# Patient Record
Sex: FEMALE | Race: WHITE | Employment: FULL TIME | URBAN - METROPOLITAN AREA
[De-identification: names, ages, dates, MRNs, and addresses within clinical notes are randomized per-mention and may not be internally consistent; named-entity substitution may affect disease eponyms.]

---

## 2017-01-16 ENCOUNTER — OFFICE VISIT (OUTPATIENT)
Dept: FAMILY MEDICINE CLINIC | Age: 47
End: 2017-01-16

## 2017-01-16 VITALS
RESPIRATION RATE: 14 BRPM | SYSTOLIC BLOOD PRESSURE: 112 MMHG | TEMPERATURE: 98.5 F | HEART RATE: 74 BPM | DIASTOLIC BLOOD PRESSURE: 70 MMHG | WEIGHT: 156 LBS | BODY MASS INDEX: 23.72 KG/M2

## 2017-01-16 DIAGNOSIS — F90.1 ATTENTION-DEFICIT HYPERACTIVITY DISORDER, PREDOMINANTLY HYPERACTIVE TYPE: ICD-10-CM

## 2017-01-16 DIAGNOSIS — R79.89 ELEVATED TSH: Primary | ICD-10-CM

## 2017-01-16 DIAGNOSIS — R10.13 EPIGASTRIC PAIN: ICD-10-CM

## 2017-01-16 DIAGNOSIS — K21.9 GASTROESOPHAGEAL REFLUX DISEASE WITHOUT ESOPHAGITIS: ICD-10-CM

## 2017-01-16 DIAGNOSIS — F41.8 DEPRESSION WITH ANXIETY: ICD-10-CM

## 2017-01-16 PROCEDURE — 99214 OFFICE O/P EST MOD 30 MIN: CPT | Performed by: FAMILY MEDICINE

## 2017-01-16 RX ORDER — DEXTROAMPHETAMINE SACCHARATE, AMPHETAMINE ASPARTATE, DEXTROAMPHETAMINE SULFATE AND AMPHETAMINE SULFATE 3.75; 3.75; 3.75; 3.75 MG/1; MG/1; MG/1; MG/1
15 TABLET ORAL 3 TIMES DAILY
Qty: 90 TABLET | Refills: 0 | Status: CANCELLED | OUTPATIENT
Start: 2017-01-16

## 2017-01-16 RX ORDER — DEXTROAMPHETAMINE SACCHARATE, AMPHETAMINE ASPARTATE, DEXTROAMPHETAMINE SULFATE AND AMPHETAMINE SULFATE 2.5; 2.5; 2.5; 2.5 MG/1; MG/1; MG/1; MG/1
10 TABLET ORAL 3 TIMES DAILY
Qty: 90 TABLET | Refills: 0 | Status: SHIPPED | OUTPATIENT
Start: 2017-01-16 | End: 2017-06-28 | Stop reason: DRUGHIGH

## 2017-01-16 RX ORDER — BUSPIRONE HYDROCHLORIDE 15 MG/1
15 TABLET ORAL 3 TIMES DAILY
Qty: 90 TABLET | Refills: 1 | Status: SHIPPED | OUTPATIENT
Start: 2017-01-16 | End: 2017-02-06 | Stop reason: SDUPTHER

## 2017-01-16 RX ORDER — OMEPRAZOLE 40 MG/1
40 CAPSULE, DELAYED RELEASE ORAL DAILY
Qty: 60 CAPSULE | Refills: 1 | Status: SHIPPED | OUTPATIENT
Start: 2017-01-16 | End: 2017-04-03 | Stop reason: SDUPTHER

## 2017-01-16 RX ORDER — HYDROCODONE BITARTRATE AND ACETAMINOPHEN 5; 325 MG/1; MG/1
TABLET ORAL
COMMUNITY
Start: 2016-12-27 | End: 2017-06-28 | Stop reason: ALTCHOICE

## 2017-01-16 RX ORDER — OXYCODONE HYDROCHLORIDE AND ACETAMINOPHEN 5; 325 MG/1; MG/1
TABLET ORAL
COMMUNITY
Start: 2017-01-07 | End: 2017-06-28 | Stop reason: ALTCHOICE

## 2017-02-06 ENCOUNTER — TELEPHONE (OUTPATIENT)
Dept: FAMILY MEDICINE CLINIC | Age: 47
End: 2017-02-06

## 2017-02-13 ENCOUNTER — OFFICE VISIT (OUTPATIENT)
Dept: FAMILY MEDICINE CLINIC | Age: 47
End: 2017-02-13

## 2017-02-13 VITALS
HEART RATE: 66 BPM | BODY MASS INDEX: 24.6 KG/M2 | OXYGEN SATURATION: 99 % | DIASTOLIC BLOOD PRESSURE: 78 MMHG | RESPIRATION RATE: 16 BRPM | WEIGHT: 161.8 LBS | TEMPERATURE: 98.6 F | SYSTOLIC BLOOD PRESSURE: 112 MMHG

## 2017-02-13 DIAGNOSIS — F41.8 DEPRESSION WITH ANXIETY: ICD-10-CM

## 2017-02-13 DIAGNOSIS — R10.13 EPIGASTRIC PAIN: ICD-10-CM

## 2017-02-13 DIAGNOSIS — F90.9 ATTENTION DEFICIT HYPERACTIVITY DISORDER (ADHD), UNSPECIFIED ADHD TYPE: Primary | ICD-10-CM

## 2017-02-13 PROCEDURE — 99214 OFFICE O/P EST MOD 30 MIN: CPT | Performed by: FAMILY MEDICINE

## 2017-02-13 RX ORDER — DEXTROAMPHETAMINE SACCHARATE, AMPHETAMINE ASPARTATE, DEXTROAMPHETAMINE SULFATE AND AMPHETAMINE SULFATE 3.75; 3.75; 3.75; 3.75 MG/1; MG/1; MG/1; MG/1
15 TABLET ORAL 2 TIMES DAILY
Qty: 60 TABLET | Refills: 0 | Status: SHIPPED | OUTPATIENT
Start: 2017-02-13 | End: 2017-03-13 | Stop reason: SDUPTHER

## 2017-02-26 ENCOUNTER — TELEPHONE (OUTPATIENT)
Dept: FAMILY MEDICINE CLINIC | Age: 47
End: 2017-02-26

## 2017-02-26 DIAGNOSIS — G43.901 MIGRAINE WITH STATUS MIGRAINOSUS, NOT INTRACTABLE, UNSPECIFIED MIGRAINE TYPE: ICD-10-CM

## 2017-02-26 DIAGNOSIS — G43.901 MIGRAINE WITH STATUS MIGRAINOSUS, NOT INTRACTABLE, UNSPECIFIED MIGRAINE TYPE: Primary | ICD-10-CM

## 2017-02-26 RX ORDER — ONDANSETRON 4 MG/1
4-8 TABLET, ORALLY DISINTEGRATING ORAL EVERY 8 HOURS PRN
Qty: 10 TABLET | Refills: 2 | Status: SHIPPED | OUTPATIENT
Start: 2017-02-26 | End: 2017-06-28 | Stop reason: ALTCHOICE

## 2017-02-26 RX ORDER — BUTALBITAL, ACETAMINOPHEN AND CAFFEINE 50; 325; 40 MG/1; MG/1; MG/1
1 TABLET ORAL EVERY 4 HOURS PRN
Qty: 10 TABLET | Refills: 2 | Status: SHIPPED | OUTPATIENT
Start: 2017-02-26 | End: 2021-09-30 | Stop reason: ALTCHOICE

## 2017-02-26 RX ORDER — BUTALBITAL, ACETAMINOPHEN AND CAFFEINE 50; 325; 40 MG/1; MG/1; MG/1
1 TABLET ORAL EVERY 4 HOURS PRN
Qty: 10 TABLET | Refills: 2 | Status: SHIPPED | OUTPATIENT
Start: 2017-02-26 | End: 2017-02-26 | Stop reason: SDUPTHER

## 2017-02-26 RX ORDER — ONDANSETRON 4 MG/1
4-8 TABLET, ORALLY DISINTEGRATING ORAL EVERY 8 HOURS PRN
Qty: 10 TABLET | Refills: 2 | OUTPATIENT
Start: 2017-02-26 | End: 2017-02-26 | Stop reason: SDUPTHER

## 2017-03-02 ENCOUNTER — OFFICE VISIT (OUTPATIENT)
Dept: FAMILY MEDICINE CLINIC | Age: 47
End: 2017-03-02

## 2017-03-02 VITALS
OXYGEN SATURATION: 99 % | BODY MASS INDEX: 24.51 KG/M2 | RESPIRATION RATE: 16 BRPM | WEIGHT: 161.2 LBS | TEMPERATURE: 98.3 F | DIASTOLIC BLOOD PRESSURE: 78 MMHG | SYSTOLIC BLOOD PRESSURE: 122 MMHG | HEART RATE: 83 BPM

## 2017-03-02 DIAGNOSIS — G97.1 SPINAL PUNCTURE HEADACHE: ICD-10-CM

## 2017-03-02 DIAGNOSIS — G44.52 NEW DAILY PERSISTENT HEADACHE: Primary | ICD-10-CM

## 2017-03-02 PROCEDURE — 99214 OFFICE O/P EST MOD 30 MIN: CPT | Performed by: FAMILY MEDICINE

## 2017-03-02 RX ORDER — PREDNISONE 20 MG/1
TABLET ORAL
Qty: 18 TABLET | Refills: 0 | Status: SHIPPED | OUTPATIENT
Start: 2017-03-02 | End: 2017-03-12

## 2017-03-02 RX ORDER — SUMATRIPTAN 100 MG/1
100 TABLET, FILM COATED ORAL DAILY PRN
Qty: 9 TABLET | Refills: 3 | Status: SHIPPED | OUTPATIENT
Start: 2017-03-02 | End: 2022-06-30

## 2017-03-02 RX ORDER — HYDROCODONE BITARTRATE AND ACETAMINOPHEN 5; 325 MG/1; MG/1
1 TABLET ORAL EVERY 6 HOURS PRN
Qty: 10 TABLET | Refills: 0 | Status: SHIPPED | OUTPATIENT
Start: 2017-03-02 | End: 2017-06-28 | Stop reason: ALTCHOICE

## 2017-03-13 ENCOUNTER — OFFICE VISIT (OUTPATIENT)
Dept: FAMILY MEDICINE CLINIC | Age: 47
End: 2017-03-13

## 2017-03-13 VITALS
SYSTOLIC BLOOD PRESSURE: 123 MMHG | TEMPERATURE: 98.2 F | WEIGHT: 161.8 LBS | BODY MASS INDEX: 24.6 KG/M2 | OXYGEN SATURATION: 100 % | RESPIRATION RATE: 12 BRPM | HEART RATE: 88 BPM | DIASTOLIC BLOOD PRESSURE: 79 MMHG

## 2017-03-13 DIAGNOSIS — F41.8 DEPRESSION WITH ANXIETY: ICD-10-CM

## 2017-03-13 DIAGNOSIS — F90.9 ATTENTION DEFICIT HYPERACTIVITY DISORDER (ADHD), UNSPECIFIED ADHD TYPE: ICD-10-CM

## 2017-03-13 PROCEDURE — 99214 OFFICE O/P EST MOD 30 MIN: CPT | Performed by: FAMILY MEDICINE

## 2017-03-13 RX ORDER — DEXTROAMPHETAMINE SACCHARATE, AMPHETAMINE ASPARTATE, DEXTROAMPHETAMINE SULFATE AND AMPHETAMINE SULFATE 3.75; 3.75; 3.75; 3.75 MG/1; MG/1; MG/1; MG/1
15 TABLET ORAL 3 TIMES DAILY
Qty: 90 TABLET | Refills: 0 | Status: SHIPPED | OUTPATIENT
Start: 2017-03-13 | End: 2017-04-03 | Stop reason: SDUPTHER

## 2017-03-13 RX ORDER — BUSPIRONE HYDROCHLORIDE 15 MG/1
15 TABLET ORAL 3 TIMES DAILY
Qty: 90 TABLET | Refills: 5 | Status: SHIPPED | OUTPATIENT
Start: 2017-03-13 | End: 2017-04-03 | Stop reason: SDUPTHER

## 2017-03-13 RX ORDER — DEXTROAMPHETAMINE SACCHARATE, AMPHETAMINE ASPARTATE, DEXTROAMPHETAMINE SULFATE AND AMPHETAMINE SULFATE 3.75; 3.75; 3.75; 3.75 MG/1; MG/1; MG/1; MG/1
15 TABLET ORAL 3 TIMES DAILY
Qty: 90 TABLET | Refills: 0 | Status: CANCELLED | OUTPATIENT
Start: 2017-05-12

## 2017-03-13 RX ORDER — DEXTROAMPHETAMINE SACCHARATE, AMPHETAMINE ASPARTATE, DEXTROAMPHETAMINE SULFATE AND AMPHETAMINE SULFATE 3.75; 3.75; 3.75; 3.75 MG/1; MG/1; MG/1; MG/1
15 TABLET ORAL 3 TIMES DAILY
Qty: 90 TABLET | Refills: 0 | Status: CANCELLED | OUTPATIENT
Start: 2017-04-12

## 2017-03-18 ENCOUNTER — PATIENT MESSAGE (OUTPATIENT)
Dept: FAMILY MEDICINE CLINIC | Age: 47
End: 2017-03-18

## 2017-04-03 ENCOUNTER — OFFICE VISIT (OUTPATIENT)
Dept: FAMILY MEDICINE CLINIC | Age: 47
End: 2017-04-03

## 2017-04-03 VITALS
HEART RATE: 74 BPM | WEIGHT: 165 LBS | BODY MASS INDEX: 25.09 KG/M2 | SYSTOLIC BLOOD PRESSURE: 107 MMHG | DIASTOLIC BLOOD PRESSURE: 65 MMHG | OXYGEN SATURATION: 100 % | RESPIRATION RATE: 18 BRPM

## 2017-04-03 DIAGNOSIS — K21.9 GASTROESOPHAGEAL REFLUX DISEASE WITHOUT ESOPHAGITIS: ICD-10-CM

## 2017-04-03 DIAGNOSIS — F41.8 DEPRESSION WITH ANXIETY: ICD-10-CM

## 2017-04-03 DIAGNOSIS — F90.9 ATTENTION DEFICIT HYPERACTIVITY DISORDER (ADHD), UNSPECIFIED ADHD TYPE: ICD-10-CM

## 2017-04-03 PROCEDURE — 99214 OFFICE O/P EST MOD 30 MIN: CPT | Performed by: FAMILY MEDICINE

## 2017-04-03 RX ORDER — DEXTROAMPHETAMINE SACCHARATE, AMPHETAMINE ASPARTATE, DEXTROAMPHETAMINE SULFATE AND AMPHETAMINE SULFATE 3.75; 3.75; 3.75; 3.75 MG/1; MG/1; MG/1; MG/1
15 TABLET ORAL 3 TIMES DAILY
Qty: 270 TABLET | Refills: 0 | Status: SHIPPED | OUTPATIENT
Start: 2017-04-03 | End: 2017-05-04 | Stop reason: SDUPTHER

## 2017-04-03 RX ORDER — OMEPRAZOLE 40 MG/1
40 CAPSULE, DELAYED RELEASE ORAL DAILY
Qty: 180 CAPSULE | Refills: 0 | Status: SHIPPED | OUTPATIENT
Start: 2017-04-03 | End: 2021-09-30 | Stop reason: ALTCHOICE

## 2017-04-03 RX ORDER — BUSPIRONE HYDROCHLORIDE 15 MG/1
15 TABLET ORAL 3 TIMES DAILY
Qty: 270 TABLET | Refills: 0 | Status: SHIPPED | OUTPATIENT
Start: 2017-04-03 | End: 2017-06-28

## 2017-05-02 ENCOUNTER — PATIENT MESSAGE (OUTPATIENT)
Dept: FAMILY MEDICINE CLINIC | Age: 47
End: 2017-05-02

## 2017-05-02 RX ORDER — DEXTROAMPHETAMINE SACCHARATE, AMPHETAMINE ASPARTATE, DEXTROAMPHETAMINE SULFATE AND AMPHETAMINE SULFATE 3.75; 3.75; 3.75; 3.75 MG/1; MG/1; MG/1; MG/1
15 TABLET ORAL 3 TIMES DAILY
Qty: 90 TABLET | Refills: 0 | Status: CANCELLED | OUTPATIENT
Start: 2017-06-02

## 2017-05-02 RX ORDER — DEXTROAMPHETAMINE SACCHARATE, AMPHETAMINE ASPARTATE, DEXTROAMPHETAMINE SULFATE AND AMPHETAMINE SULFATE 3.75; 3.75; 3.75; 3.75 MG/1; MG/1; MG/1; MG/1
15 TABLET ORAL 3 TIMES DAILY
Qty: 90 TABLET | Refills: 0 | Status: CANCELLED | OUTPATIENT
Start: 2017-05-02

## 2017-06-28 ENCOUNTER — OFFICE VISIT (OUTPATIENT)
Dept: FAMILY MEDICINE CLINIC | Age: 47
End: 2017-06-28

## 2017-06-28 VITALS
OXYGEN SATURATION: 100 % | RESPIRATION RATE: 12 BRPM | HEART RATE: 90 BPM | WEIGHT: 157.2 LBS | BODY MASS INDEX: 23.9 KG/M2 | TEMPERATURE: 98.9 F | DIASTOLIC BLOOD PRESSURE: 75 MMHG | SYSTOLIC BLOOD PRESSURE: 110 MMHG

## 2017-06-28 DIAGNOSIS — J01.00 ACUTE MAXILLARY SINUSITIS, RECURRENCE NOT SPECIFIED: ICD-10-CM

## 2017-06-28 DIAGNOSIS — F90.9 ATTENTION DEFICIT HYPERACTIVITY DISORDER (ADHD), UNSPECIFIED ADHD TYPE: ICD-10-CM

## 2017-06-28 DIAGNOSIS — F41.8 DEPRESSION WITH ANXIETY: Primary | ICD-10-CM

## 2017-06-28 PROCEDURE — 99214 OFFICE O/P EST MOD 30 MIN: CPT | Performed by: FAMILY MEDICINE

## 2017-06-28 RX ORDER — DEXTROAMPHETAMINE SACCHARATE, AMPHETAMINE ASPARTATE, DEXTROAMPHETAMINE SULFATE AND AMPHETAMINE SULFATE 5; 5; 5; 5 MG/1; MG/1; MG/1; MG/1
20 TABLET ORAL 3 TIMES DAILY
Qty: 90 TABLET | Refills: 0 | Status: SHIPPED | OUTPATIENT
Start: 2017-08-26 | End: 2017-09-21 | Stop reason: SDUPTHER

## 2017-06-28 RX ORDER — DEXTROAMPHETAMINE SACCHARATE, AMPHETAMINE ASPARTATE, DEXTROAMPHETAMINE SULFATE AND AMPHETAMINE SULFATE 5; 5; 5; 5 MG/1; MG/1; MG/1; MG/1
20 TABLET ORAL 3 TIMES DAILY
Qty: 90 TABLET | Refills: 0 | Status: SHIPPED | OUTPATIENT
Start: 2017-06-28 | End: 2021-09-30 | Stop reason: ALTCHOICE

## 2017-06-28 RX ORDER — AMOXICILLIN AND CLAVULANATE POTASSIUM 875; 125 MG/1; MG/1
1 TABLET, FILM COATED ORAL EVERY 12 HOURS
Qty: 20 TABLET | Refills: 0 | Status: SHIPPED | OUTPATIENT
Start: 2017-06-28 | End: 2017-07-08

## 2017-06-28 RX ORDER — QUETIAPINE FUMARATE 25 MG/1
TABLET, FILM COATED ORAL
Qty: 60 TABLET | Refills: 0 | Status: SHIPPED | OUTPATIENT
Start: 2017-06-28 | End: 2017-09-27 | Stop reason: SDUPTHER

## 2017-06-28 RX ORDER — DEXTROAMPHETAMINE SACCHARATE, AMPHETAMINE ASPARTATE, DEXTROAMPHETAMINE SULFATE AND AMPHETAMINE SULFATE 5; 5; 5; 5 MG/1; MG/1; MG/1; MG/1
20 TABLET ORAL 3 TIMES DAILY
Qty: 90 TABLET | Refills: 0 | Status: SHIPPED | OUTPATIENT
Start: 2017-07-28 | End: 2017-09-21 | Stop reason: SDUPTHER

## 2017-06-28 ASSESSMENT — PATIENT HEALTH QUESTIONNAIRE - PHQ9
SUM OF ALL RESPONSES TO PHQ QUESTIONS 1-9: 0
SUM OF ALL RESPONSES TO PHQ9 QUESTIONS 1 & 2: 0
2. FEELING DOWN, DEPRESSED OR HOPELESS: 0
1. LITTLE INTEREST OR PLEASURE IN DOING THINGS: 0

## 2017-09-07 PROBLEM — N12 PYELONEPHRITIS: Status: ACTIVE | Noted: 2017-09-01

## 2017-09-08 ENCOUNTER — TELEPHONE (OUTPATIENT)
Dept: FAMILY MEDICINE CLINIC | Age: 47
End: 2017-09-08

## 2017-09-21 ENCOUNTER — OFFICE VISIT (OUTPATIENT)
Dept: FAMILY MEDICINE CLINIC | Age: 47
End: 2017-09-21

## 2017-09-21 VITALS
HEART RATE: 90 BPM | WEIGHT: 157 LBS | HEIGHT: 68 IN | DIASTOLIC BLOOD PRESSURE: 91 MMHG | BODY MASS INDEX: 23.79 KG/M2 | SYSTOLIC BLOOD PRESSURE: 125 MMHG | RESPIRATION RATE: 20 BRPM | TEMPERATURE: 97.4 F

## 2017-09-21 DIAGNOSIS — M77.12 LATERAL EPICONDYLITIS OF BOTH ELBOWS: ICD-10-CM

## 2017-09-21 DIAGNOSIS — N12 PYELONEPHRITIS: ICD-10-CM

## 2017-09-21 DIAGNOSIS — G56.03 BILATERAL CARPAL TUNNEL SYNDROME: Primary | ICD-10-CM

## 2017-09-21 DIAGNOSIS — F90.9 ATTENTION DEFICIT HYPERACTIVITY DISORDER (ADHD), UNSPECIFIED ADHD TYPE: ICD-10-CM

## 2017-09-21 DIAGNOSIS — M65.4 DE QUERVAIN'S TENOSYNOVITIS, BILATERAL: ICD-10-CM

## 2017-09-21 DIAGNOSIS — M77.11 LATERAL EPICONDYLITIS OF BOTH ELBOWS: ICD-10-CM

## 2017-09-21 PROCEDURE — 99214 OFFICE O/P EST MOD 30 MIN: CPT | Performed by: FAMILY MEDICINE

## 2017-09-21 RX ORDER — DEXTROAMPHETAMINE SACCHARATE, AMPHETAMINE ASPARTATE, DEXTROAMPHETAMINE SULFATE AND AMPHETAMINE SULFATE 5; 5; 5; 5 MG/1; MG/1; MG/1; MG/1
20 TABLET ORAL 3 TIMES DAILY
Qty: 90 TABLET | Refills: 0 | Status: SHIPPED | OUTPATIENT
Start: 2017-09-25 | End: 2021-09-30 | Stop reason: ALTCHOICE

## 2017-09-21 RX ORDER — HYDROCODONE BITARTRATE AND ACETAMINOPHEN 5; 325 MG/1; MG/1
1 TABLET ORAL EVERY 6 HOURS PRN
Qty: 20 TABLET | Refills: 0 | Status: SHIPPED | OUTPATIENT
Start: 2017-09-21 | End: 2017-09-28

## 2017-09-21 RX ORDER — PREDNISONE 20 MG/1
TABLET ORAL
Qty: 12 TABLET | Refills: 0 | Status: SHIPPED | OUTPATIENT
Start: 2017-09-21 | End: 2017-10-01

## 2017-09-21 RX ORDER — DEXTROAMPHETAMINE SACCHARATE, AMPHETAMINE ASPARTATE, DEXTROAMPHETAMINE SULFATE AND AMPHETAMINE SULFATE 5; 5; 5; 5 MG/1; MG/1; MG/1; MG/1
20 TABLET ORAL 3 TIMES DAILY
Qty: 90 TABLET | Refills: 0 | Status: SHIPPED | OUTPATIENT
Start: 2017-10-25 | End: 2022-06-30

## 2017-09-27 ENCOUNTER — TELEPHONE (OUTPATIENT)
Dept: FAMILY MEDICINE CLINIC | Age: 47
End: 2017-09-27

## 2017-09-27 DIAGNOSIS — F41.8 DEPRESSION WITH ANXIETY: ICD-10-CM

## 2017-09-27 DIAGNOSIS — R11.2 NAUSEA AND VOMITING, INTRACTABILITY OF VOMITING NOT SPECIFIED, UNSPECIFIED VOMITING TYPE: Primary | ICD-10-CM

## 2017-09-27 RX ORDER — QUETIAPINE FUMARATE 25 MG/1
TABLET, FILM COATED ORAL
Qty: 60 TABLET | Refills: 0 | Status: SHIPPED | OUTPATIENT
Start: 2017-09-27 | End: 2017-11-03 | Stop reason: SDUPTHER

## 2017-09-27 RX ORDER — PROMETHAZINE HYDROCHLORIDE 25 MG/1
25 SUPPOSITORY RECTAL EVERY 6 HOURS PRN
Qty: 12 SUPPOSITORY | Refills: 0 | Status: SHIPPED | OUTPATIENT
Start: 2017-09-27 | End: 2017-10-04

## 2017-09-28 ENCOUNTER — TELEPHONE (OUTPATIENT)
Dept: FAMILY MEDICINE CLINIC | Age: 47
End: 2017-09-28

## 2017-10-17 ENCOUNTER — TELEPHONE (OUTPATIENT)
Dept: FAMILY MEDICINE CLINIC | Age: 47
End: 2017-10-17

## 2017-11-01 ENCOUNTER — TELEPHONE (OUTPATIENT)
Dept: FAMILY MEDICINE CLINIC | Age: 47
End: 2017-11-01

## 2017-11-03 DIAGNOSIS — F41.8 DEPRESSION WITH ANXIETY: ICD-10-CM

## 2017-11-04 RX ORDER — QUETIAPINE FUMARATE 25 MG/1
TABLET, FILM COATED ORAL
Qty: 60 TABLET | Refills: 0 | Status: SHIPPED | OUTPATIENT
Start: 2017-11-04 | End: 2022-06-30

## 2018-02-19 ENCOUNTER — TELEPHONE (OUTPATIENT)
Dept: ORTHOPEDIC SURGERY | Age: 48
End: 2018-02-19

## 2018-02-19 RX ORDER — QUETIAPINE FUMARATE 50 MG/1
TABLET, FILM COATED ORAL
Qty: 60 TABLET | Refills: 1 | Status: SHIPPED | OUTPATIENT
Start: 2018-02-19 | End: 2021-09-30 | Stop reason: ALTCHOICE

## 2021-08-16 LAB
AVERAGE GLUCOSE: 105
HBA1C MFR BLD: 5.3 %

## 2021-08-17 LAB
CHOLESTEROL, TOTAL: 164 MG/DL
CHOLESTEROL/HDL RATIO: NORMAL
HDLC SERPL-MCNC: 56 MG/DL (ref 35–70)
LDL CHOLESTEROL CALCULATED: 96 MG/DL (ref 0–160)
NONHDLC SERPL-MCNC: NORMAL MG/DL
TRIGL SERPL-MCNC: 59 MG/DL
VLDLC SERPL CALC-MCNC: 12 MG/DL

## 2021-09-30 ENCOUNTER — TELEPHONE (OUTPATIENT)
Dept: PRIMARY CARE CLINIC | Age: 51
End: 2021-09-30

## 2021-09-30 ENCOUNTER — OFFICE VISIT (OUTPATIENT)
Dept: PRIMARY CARE CLINIC | Age: 51
End: 2021-09-30
Payer: MEDICAID

## 2021-09-30 VITALS
RESPIRATION RATE: 20 BRPM | BODY MASS INDEX: 26.67 KG/M2 | OXYGEN SATURATION: 98 % | WEIGHT: 176 LBS | SYSTOLIC BLOOD PRESSURE: 132 MMHG | DIASTOLIC BLOOD PRESSURE: 88 MMHG | HEART RATE: 81 BPM | HEIGHT: 68 IN | TEMPERATURE: 97.3 F

## 2021-09-30 DIAGNOSIS — K27.9 PEPTIC ULCER DISEASE: ICD-10-CM

## 2021-09-30 DIAGNOSIS — M48.061 SPINAL STENOSIS OF LUMBAR REGION, UNSPECIFIED WHETHER NEUROGENIC CLAUDICATION PRESENT: ICD-10-CM

## 2021-09-30 DIAGNOSIS — F31.9 BIPOLAR AFFECTIVE DISORDER, REMISSION STATUS UNSPECIFIED (HCC): ICD-10-CM

## 2021-09-30 DIAGNOSIS — F90.9 ATTENTION DEFICIT HYPERACTIVITY DISORDER (ADHD), UNSPECIFIED ADHD TYPE: ICD-10-CM

## 2021-09-30 DIAGNOSIS — I26.99 PULMONARY EMBOLISM, OTHER, UNSPECIFIED CHRONICITY, UNSPECIFIED WHETHER ACUTE COR PULMONALE PRESENT (HCC): ICD-10-CM

## 2021-09-30 DIAGNOSIS — I83.812 VARICOSE VEINS OF LEFT LOWER EXTREMITY WITH PAIN: ICD-10-CM

## 2021-09-30 DIAGNOSIS — Z12.39 ENCOUNTER FOR SCREENING FOR MALIGNANT NEOPLASM OF BREAST, UNSPECIFIED SCREENING MODALITY: ICD-10-CM

## 2021-09-30 DIAGNOSIS — I63.9 CEREBROVASCULAR ACCIDENT (CVA), UNSPECIFIED MECHANISM (HCC): Primary | ICD-10-CM

## 2021-09-30 PROCEDURE — G8427 DOCREV CUR MEDS BY ELIG CLIN: HCPCS | Performed by: FAMILY MEDICINE

## 2021-09-30 PROCEDURE — 1036F TOBACCO NON-USER: CPT | Performed by: FAMILY MEDICINE

## 2021-09-30 PROCEDURE — 99204 OFFICE O/P NEW MOD 45 MIN: CPT | Performed by: FAMILY MEDICINE

## 2021-09-30 PROCEDURE — G8419 CALC BMI OUT NRM PARAM NOF/U: HCPCS | Performed by: FAMILY MEDICINE

## 2021-09-30 RX ORDER — DEXTROAMPHETAMINE SACCHARATE, AMPHETAMINE ASPARTATE MONOHYDRATE, DEXTROAMPHETAMINE SULFATE AND AMPHETAMINE SULFATE 7.5; 7.5; 7.5; 7.5 MG/1; MG/1; MG/1; MG/1
30 CAPSULE, EXTENDED RELEASE ORAL EVERY MORNING
COMMUNITY
End: 2022-06-30

## 2021-09-30 RX ORDER — CELECOXIB 200 MG/1
200 CAPSULE ORAL DAILY
Qty: 30 CAPSULE | Refills: 1 | Status: SHIPPED | OUTPATIENT
Start: 2021-09-30 | End: 2022-06-30

## 2021-09-30 RX ORDER — ACETAMINOPHEN 500 MG
1000 TABLET ORAL EVERY 6 HOURS PRN
Qty: 120 TABLET | Refills: 2 | Status: SHIPPED | OUTPATIENT
Start: 2021-09-30 | End: 2022-06-30

## 2021-09-30 SDOH — ECONOMIC STABILITY: FOOD INSECURITY: WITHIN THE PAST 12 MONTHS, YOU WORRIED THAT YOUR FOOD WOULD RUN OUT BEFORE YOU GOT MONEY TO BUY MORE.: NEVER TRUE

## 2021-09-30 SDOH — ECONOMIC STABILITY: FOOD INSECURITY: WITHIN THE PAST 12 MONTHS, THE FOOD YOU BOUGHT JUST DIDN'T LAST AND YOU DIDN'T HAVE MONEY TO GET MORE.: NEVER TRUE

## 2021-09-30 ASSESSMENT — ENCOUNTER SYMPTOMS
ABDOMINAL PAIN: 0
COUGH: 0
SHORTNESS OF BREATH: 0
BLOOD IN STOOL: 0
NAUSEA: 0
VOMITING: 0
BACK PAIN: 1
SORE THROAT: 0

## 2021-09-30 ASSESSMENT — SOCIAL DETERMINANTS OF HEALTH (SDOH): HOW HARD IS IT FOR YOU TO PAY FOR THE VERY BASICS LIKE FOOD, HOUSING, MEDICAL CARE, AND HEATING?: NOT HARD AT ALL

## 2021-09-30 NOTE — TELEPHONE ENCOUNTER
PA COVER MY MEDS  Medication:Celecoxib 200MG capsules  Key:[id number] - PA Case ID: [id number] - Rx #: [id number]  Status:PENDING

## 2021-09-30 NOTE — PROGRESS NOTES
appendectomy    Pt is a nonsmoker and uses alcohol occasionally and denies any illegal drug use    FHx negative for CAD or diabetes; FHx positive for breast cancer (MAunt and MGM)     Pt reports no screening mammogram in over 7 years        Hematology Note 09/07/2021  Patient was recently evaluated during hospitalization in August 2021. She had presented with acute onset of left-sided weakness, slurred speech, difficulty concentrating and dizziness. Had received TPA at admission. Reports that her deficits are resolved. She was started on antiplatelet therapy with aspirin, also atorvastatin. Hypercoagulable work-up was obtained. Here to discuss these results. Of note, CT of the head, MRI of the brain as well as CTA head and neck were unremarkable for any evidence of acute ischemia or any vascular abnormalities. Echocardiogram was also unremarkable, LVEF at 60% with no valvular abnormalities. Patient has history of prior PE as well as DVT, diagnosed around 1999, was on anticoagulation with Coumadin at that time. Notes that she was transitioned to heparin during her pregnancy. Disease History:  -History of prior PE as well as DVT. Appears this was diagnosed around 1999 and she was on anticoagulation with Coumadin  -Transitioned to heparin during her pregnancy. Post delivery of her daughter, she was on Coumadin therapy again and states that she had stopped Coumadin therapy nearly 20 years ago.  No miscarriages  -Notes history of blood clots with her maternal grandmother, none with her mother or siblings   No residual neurologic deficits  -Remains on aspirin and statin  -Hypercoagulable work-up was unremarkable for any inherited or acquired thrombophilic state; no evidence of factor V Leiden, prothrombin gene mutation, protein C or S deficiency or antiphospholipid antibody    CBC today unremarkable for any cytopenias    Currently no indication for anticoagulation, discussed negative hypercoagulable work-up results with patient          SPINE CARE PATH LOW BACK PAIN: INTIAL    hx of l3-s1 fusion 1/20    l5-s1 fusion 2003  improved dr Glenis Wolfe    hxof c5-6 acdf april 2019after a fall and left  arm weakness  some shoulder pain  arm pain resolved    2year worsening low back pain at times to   left thigh, shin pain  relates started after fall 4/19    hx of l3-s1 fusion 1/20  drop foot, bladder issue after surgery  felt weaker left leg  worse after    saw pain management  percocet, tizanidine  neurontin upto 1200 mg bid  stopped going to pain management  ( sketchy) 8 months  switched pain management  couple of injections, PT     Prior Therapy:  gabapentin 1200 mg  bid     tizanidine    percocet off 8 months  after surgery  some help    PT- last 12/19  initially helping but some set back/flareup after    ibuprofen some gi upset    Litigation: No    Workers' Compensation: No    imaging reviewed  lumbar mri 5/26/21    personally reviewed    The patient is status post L3, L4 and L5 transpedicular screw and patricia fixation with intervertebral device placement with metallic artifact secondary to hardware limiting evaluation within this region. Disc signal and disc space heights are well-preserved at the T11-L3 levels. Vertebral body heights are well-preserved. There is stable mild retrolisthesis of L2 on L3 and of L3 on L4. Signal within the conus is unremarkable. No acute compression fracture deformities are present. At the T11-T12 level only sagittal imaging was obtained. There are no focal disc nor neural foraminal abnormalities. At the T12-L1 level there are no disc nor neural foraminal abnormalities. At the L1-L2 level there are no disc nor neural abnormalities. At the L2-L3 level there is a mild bulge with eccentric disc material noted in a bilobed fashion within the paracentral lateral recess, neural foraminal and lateral aspects bilaterally. There is minimal bilateral neural foraminal narrowing.  There is bilateral facet hypertrophic disease and ligamentum flavum hypertrophy. At the L3-L4 level there is a laminectomy defect. There is metallic artifact. There is epidural scar identified within the left lateral recess region and left neural foraminal region. There is minimal bilateral neural foraminal narrowing. At the L4-5 level there is a laminectomy defect with a diffuse bulge/protrusion and metallic artifact limiting evaluation. There is mild to moderate bilateral neural foraminal narrowing. At the L5-S1 level there is stable minimal bulge with some facet hypertrophic disease and minimal bilateral neural foraminal narrowing. IMPRESSION:     Postsurgical changes with stable mild multilevel lumbar spondylitic disease  ASSESSMENT/PLAN  (M96.1) Failed back surgical syndrome (primary encounter diagnosis)  Comment: hx of l3-5 fusion 1/20  for back, leg pain  felt more weakness, pain after    Plan: XR LUMBAR MOTION 4V AP/LAT/ FLEX/EXT  r/o instability  mri results discussed- no significant adjacent level issues,   canal stenosis seen  - discussed chronic pain syndrome  - if no spine intervention is indicated, we will  consider scs trial with pain management  -given zanaflex #40 prn for spasms  - discussed PT, she relates will start aquatic Program again  as it helped in the past  - no spine surgery indicated at this point        Review of Systems   Constitutional: Negative for fatigue and fever. HENT: Negative for nosebleeds and sore throat. Eyes:        Negative blurred vision or diplopia   Respiratory: Negative for cough and shortness of breath. Cardiovascular: Positive for leg swelling (LLE (chronic from varicose veins)). Negative for chest pain and palpitations. Gastrointestinal: Negative for abdominal pain, blood in stool, nausea and vomiting. Negative melena or indigestion   Musculoskeletal: Positive for back pain (lumbar with radiation into LLE at times (chronic)).  Negative for arthralgias and neck pain. Skin: Negative for rash. Vitals:    09/30/21 0754   BP: 132/88   Pulse: 81   Resp: 20   Temp: 97.3 °F (36.3 °C)   SpO2: 98%         Physical Exam  Vitals reviewed. Constitutional:       General: She is not in acute distress. HENT:      Mouth/Throat:      Mouth: Mucous membranes are moist.      Pharynx: Oropharynx is clear. Eyes:      General: No scleral icterus. Comments: Pink conjunctivae    Neck:      Thyroid: No thyromegaly. Comments: No carotid bruits noted B/L  Cardiovascular:      Heart sounds: Normal heart sounds. No murmur heard. No friction rub. No gallop. Comments: Venous port in place in R chest  Pulmonary:      Effort: Pulmonary effort is normal.      Breath sounds: Normal breath sounds. Abdominal:      Palpations: Abdomen is soft. Tenderness: There is no abdominal tenderness. Musculoskeletal:      Comments: No leg edema noted in RLE; Positive large mildly tender varicose vein noted medial to knee with 1+ edema but no calf tendernous to palpation   Lymphadenopathy:      Cervical: No cervical adenopathy. Skin:     Findings: No rash. Neurological:      Mental Status: She is alert. Comments: Cranial nerves 2 - 12 grossly intact; Muscle strength 5/5 throughout; Positive back pain noted on L hip flexion   Psychiatric:         Mood and Affect: Mood normal.         ASSESSMENT AND PLAN    1. Cerebrovascular accident (CVA), unspecified mechanism ([address]  Pt is currently with a nonfocal neuro exam and in the hospital had a CT scan head, MRI of the brain as well as a CTA head/neck done which were unremarkable for any evidence of acute ischemia or any vascular abnormalities. Pt also had an unremarkable 2D Echo and was evaluated by Neurology who felt that episode could be conversion disorder    2.  Spinal stenosis of lumbar region, unspecified whether neurogenic claudication present  Pt used to be followed by Pain Medicine and has been using alot of OTC ibuprofen and tylenol for control and will try pt on a daily coxeb instead of ibuprofen for better control and less GI side effect and continue tylenol as needed and reevaluate pt in 2 months  - celecoxib (CELEBREX) 200 MG capsule; Take 1 capsule by mouth daily Take with food  Dispense: 30 capsule; Refill: 1  - acetaminophen (TYLENOL) 500 MG tablet; Take 2 tablets by mouth every 6 hours as needed for Pain  Dispense: 120 tablet; Refill: 2    3. Pulmonary embolism, other, unspecified chronicity, unspecified whether acute cor pulmonale present ([facility] [address]  Pt denies any CP or SOB and is with stable VS and is being followed by Hematology and had a hypercoagulable work-up done which was unremarkable for any inherited or acquired thrombophilic state    4. Peptic ulcer disease  Patient clinically stable on present medications and denies any melena and is with a nontender abdomen on PE    5. Attention deficit hyperactivity disorder (ADHD)/Bipolar affective disorder  Pt is on high dosages of adderal and seroquel for control and will Refer pt to Psychiatry for evaluation and management especially given her recent stroke versus conversion disorder  - External Referral to Psychiatry    6. Encounter for screening for malignant neoplasm of breast, unspecified screening modality  - [facility] CANDICE DIGITAL SCREEN BILATERAL; Future    7. Varicose veins of left lower extremity with pain  Will Refer pt to Vascular Surgery for evaluation and treatment  - Polo Mishra MD, Vascular Surgery, [facility]        Kiarra Velásquez MD      Return in about 2 months (around 11/30/2021). Patient Instructions     Patient Education      Go to the ER ASAP if you develop any chest pain, shortness of breath, facial droop, slurred speech, weakness/numbness in your arms or legs or double vision! Learning About an Ischemic Stroke  What is an ischemic stroke?      An ischemic (say \"prn-LSU-fdfk\") stroke occurs when a blood clot blocks a blood vessel in the brain. This means that blood cannot flow to some part of the brain. Without blood and the oxygen it carries, this part of the brain starts to die. The part of the body controlled by the damaged area of the brain cannot work properly. This is different from a hemorrhagic (say \"owj-ita-EF-jick\") stroke, which happens when a blood vessel in the brain has burst open or has started to leak. The brain damage from a stroke starts within minutes. Quick treatment can help limit damage to the brain and make recovery more likely. People who have had a stroke may have a hard time talking, understanding things, and making decisions. They may have to relearn daily activities, such as how to eat, bathe, and dress. How well someone recovers from a stroke depends on how quickly the person gets to the hospital, where in the brain the stroke happened, and how severe it was. Training and therapy also make a difference in how well people recover. What are the symptoms? If you have any of these symptoms, call 911 or other emergency services right away:   · You have symptoms of a stroke. These may include:  ? Sudden numbness, tingling, weakness, or loss of movement in your face, arm, or leg, especially on only one side of your body. ? Sudden vision changes. ? Sudden trouble speaking. ? Sudden confusion or trouble understanding simple statements. ? Sudden problems with walking or balance. ? A sudden, severe headache that is different from past headaches. Call 911 if you have symptoms that seem like a stroke, even if they go away quickly. You may have had a transient ischemic attack (TIA), sometimes called a mini-stroke. A TIA is a warning that a stroke may happen soon. Getting early treatment for a TIA can help prevent a stroke. What causes an ischemic stroke? An ischemic stroke is caused by a blood clot that blocks blood flow in the brain.  The most common causes of blood clots include:  · Hardening of the arteries (atherosclerosis). This is caused by high blood pressure, diabetes, high cholesterol, or smoking. · Atrial fibrillation. How is ischemic stroke treated? Emergency treatment may be done to restore blood flow to the brain using medicine or a procedure. You may take medicines to help prevent another stroke. Ask your doctor if a stroke rehab program is right for you. How can you prevent another stroke? · Work with your doctor to treat any health problems you have. High blood pressure, high cholesterol, atrial fibrillation, and diabetes all raise your chances of having a stroke. · Be safe with medicines. Take your medicine exactly as prescribed. Call your doctor if you think you are having a problem with your medicine. · Have a healthy lifestyle. ? Do not smoke or allow others to smoke around you. If you need help quitting, talk to your doctor about stop-smoking programs and medicines. These can increase your chances of quitting for good. Smoking makes a stroke more likely. ? Limit alcohol to 2 drinks a day for men and 1 drink a day for women. ? Lose weight if you need to. A healthy weight will help you keep your heart and body healthy. ? Be active. Ask your doctor what type and level of activity is safe for you.  ? Eat heart-healthy foods, like fruits, vegetables, and high-fiber foods. Follow-up care is a key part of your treatment and safety. Be sure to make and go to all appointments, and call your doctor if you are having problems. It's also a good idea to know your test results and keep a list of the medicines you take. Where can you learn more? Go to [email or web address]://tod.[email or web address] [email or web address] org and sign in to your [employer] account. Enter D161 in the [facility] box to learn more about \"Learning About an Ischemic Stroke. \"     If you do not have an account, please click on the \"Sign Up Now\" link.   Current as of: July 6, 2021               Content Version: 13.0  © 1798-2274 Healthwise, [employer]. Care instructions adapted under license by [facility] ([facility]). If you have questions about a medical condition or this instruction, always ask your healthcare professional. [address] any warranty or liability for your use of this information.

## 2021-09-30 NOTE — PATIENT INSTRUCTIONS
Patient Education      Go to the ER ASAP if you develop any chest pain, shortness of breath, facial droop, slurred speech, weakness/numbness in your arms or legs or double vision! Learning About an Ischemic Stroke  What is an ischemic stroke? An ischemic (say \"wpw-CLB-mric\") stroke occurs when a blood clot blocks a blood vessel in the brain. This means that blood cannot flow to some part of the brain. Without blood and the oxygen it carries, this part of the brain starts to die. The part of the body controlled by the damaged area of the brain cannot work properly. This is different from a hemorrhagic (say \"nen-utn-AO-jick\") stroke, which happens when a blood vessel in the brain has burst open or has started to leak. The brain damage from a stroke starts within minutes. Quick treatment can help limit damage to the brain and make recovery more likely. People who have had a stroke may have a hard time talking, understanding things, and making decisions. They may have to relearn daily activities, such as how to eat, bathe, and dress. How well someone recovers from a stroke depends on how quickly the person gets to the hospital, where in the brain the stroke happened, and how severe it was. Training and therapy also make a difference in how well people recover. What are the symptoms? If you have any of these symptoms, call 911 or other emergency services right away:   · You have symptoms of a stroke. These may include:  ? Sudden numbness, tingling, weakness, or loss of movement in your face, arm, or leg, especially on only one side of your body. ? Sudden vision changes. ? Sudden trouble speaking. ? Sudden confusion or trouble understanding simple statements. ? Sudden problems with walking or balance. ? A sudden, severe headache that is different from past headaches. Call 911 if you have symptoms that seem like a stroke, even if they go away quickly.  You may have had a transient ischemic attack (TIA), sometimes called a mini-stroke. A TIA is a warning that a stroke may happen soon. Getting early treatment for a TIA can help prevent a stroke. What causes an ischemic stroke? An ischemic stroke is caused by a blood clot that blocks blood flow in the brain. The most common causes of blood clots include:  · Hardening of the arteries (atherosclerosis). This is caused by high blood pressure, diabetes, high cholesterol, or smoking. · Atrial fibrillation. How is ischemic stroke treated? Emergency treatment may be done to restore blood flow to the brain using medicine or a procedure. You may take medicines to help prevent another stroke. Ask your doctor if a stroke rehab program is right for you. How can you prevent another stroke? · Work with your doctor to treat any health problems you have. High blood pressure, high cholesterol, atrial fibrillation, and diabetes all raise your chances of having a stroke. · Be safe with medicines. Take your medicine exactly as prescribed. Call your doctor if you think you are having a problem with your medicine. · Have a healthy lifestyle. ? Do not smoke or allow others to smoke around you. If you need help quitting, talk to your doctor about stop-smoking programs and medicines. These can increase your chances of quitting for good. Smoking makes a stroke more likely. ? Limit alcohol to 2 drinks a day for men and 1 drink a day for women. ? Lose weight if you need to. A healthy weight will help you keep your heart and body healthy. ? Be active. Ask your doctor what type and level of activity is safe for you.  ? Eat heart-healthy foods, like fruits, vegetables, and high-fiber foods. Follow-up care is a key part of your treatment and safety. Be sure to make and go to all appointments, and call your doctor if you are having problems. It's also a good idea to know your test results and keep a list of the medicines you take. Where can you learn more?   Go to [email or web address] and sign in to your [employer] account. Enter D161 in the [facility] box to learn more about \"Learning About an Ischemic Stroke. \"     If you do not have an account, please click on the \"Sign Up Now\" link. Current as of: July 6, 2021               Content Version: 13.0  © 0228-9387 [employer], [facility]. Care instructions adapted under license by [facility] ([facility]). If you have questions about a medical condition or this instruction, always ask your healthcare professional. [place] [address] any warranty or liability for your use of this information.

## 2021-10-01 NOTE — TELEPHONE ENCOUNTER
The medication is DENIED; [address] letter attached. If this requires a response please respond to the pool ( [facility] [address]). Thank you please advise patient.

## 2021-10-14 ENCOUNTER — NURSE TRIAGE (OUTPATIENT)
Dept: OTHER | Facility: CLINIC | Age: 51
End: 2021-10-14

## 2021-10-14 NOTE — TELEPHONE ENCOUNTER
Reason for Disposition   Caller hangs up    Protocols used: NO CONTACT OR DUPLICATE CONTACT CALL-ADULT-AH    Received call from [facility]  at [facility] with Red Flag Complaint. Brief description of triage: Pt called in with severe abd pain on R side with swelling. Pt hung up prior to this writer speaking to her. Writer did attempt to call the pt back but was unable to reach her. LVM for pt to call back at her earliest convenience. NO TRIAGE at this time. Attention Provider: Thank you for allowing me to participate in the care of your patient. The patient was connected to triage in response to information provided to the ECC/PSC. Please do not respond through this encounter as the response is not directed to a shared pool.

## 2021-12-16 NOTE — TELEPHONE ENCOUNTER
Fredy Yap ([address])  [address] [facility] Spoke with patient, she will use the [facility] for a discount to  prescription.

## 2022-06-30 ENCOUNTER — OFFICE VISIT (OUTPATIENT)
Dept: PRIMARY CARE CLINIC | Age: 52
End: 2022-06-30
Payer: COMMERCIAL

## 2022-06-30 VITALS
HEIGHT: 68 IN | DIASTOLIC BLOOD PRESSURE: 80 MMHG | SYSTOLIC BLOOD PRESSURE: 128 MMHG | WEIGHT: 181.4 LBS | BODY MASS INDEX: 27.49 KG/M2 | TEMPERATURE: 97 F | HEART RATE: 91 BPM | OXYGEN SATURATION: 98 % | RESPIRATION RATE: 18 BRPM

## 2022-06-30 DIAGNOSIS — Z12.31 ENCOUNTER FOR SCREENING MAMMOGRAM FOR BREAST CANCER: ICD-10-CM

## 2022-06-30 DIAGNOSIS — F31.9 BIPOLAR AFFECTIVE DISORDER, REMISSION STATUS UNSPECIFIED (HCC): Primary | ICD-10-CM

## 2022-06-30 DIAGNOSIS — F90.2 ATTENTION DEFICIT HYPERACTIVITY DISORDER (ADHD), COMBINED TYPE: ICD-10-CM

## 2022-06-30 DIAGNOSIS — I83.812 VARICOSE VEINS OF LEFT LOWER EXTREMITY WITH PAIN: ICD-10-CM

## 2022-06-30 PROBLEM — M51.26 HNP (HERNIATED NUCLEUS PULPOSUS), LUMBAR: Status: ACTIVE | Noted: 2018-05-11

## 2022-06-30 PROBLEM — G81.94 LEFT HEMIPARESIS (HCC): Status: ACTIVE | Noted: 2021-08-16

## 2022-06-30 PROBLEM — G43.009 MIGRAINE WITHOUT AURA AND RESPONSIVE TO TREATMENT: Status: ACTIVE | Noted: 2022-06-30

## 2022-06-30 PROBLEM — Z95.828 PORT-A-CATH IN PLACE: Status: ACTIVE | Noted: 2022-06-30

## 2022-06-30 PROBLEM — M54.16 LUMBAR RADICULOPATHY: Status: ACTIVE | Noted: 2019-04-30

## 2022-06-30 PROBLEM — K21.9 GASTROESOPHAGEAL REFLUX DISEASE WITHOUT ESOPHAGITIS: Status: ACTIVE | Noted: 2020-01-23

## 2022-06-30 PROBLEM — I34.1 MITRAL VALVE PROLAPSE: Status: ACTIVE | Noted: 2022-06-30

## 2022-06-30 PROBLEM — N12 PYELONEPHRITIS: Status: RESOLVED | Noted: 2017-09-01 | Resolved: 2022-06-30

## 2022-06-30 PROBLEM — G81.94 LEFT HEMIPARESIS (HCC): Status: RESOLVED | Noted: 2021-08-16 | Resolved: 2022-06-30

## 2022-06-30 PROBLEM — I63.9 ACUTE CVA (CEREBROVASCULAR ACCIDENT) (HCC): Status: ACTIVE | Noted: 2021-08-16

## 2022-06-30 PROBLEM — F90.9 ADHD: Status: ACTIVE | Noted: 2021-08-16

## 2022-06-30 PROBLEM — I63.9 ACUTE CEREBROVASCULAR ACCIDENT (CVA) (HCC): Status: ACTIVE | Noted: 2021-08-16

## 2022-06-30 PROBLEM — M51.379 DEGENERATION OF LUMBAR OR LUMBOSACRAL INTERVERTEBRAL DISC: Status: ACTIVE | Noted: 2017-10-02

## 2022-06-30 PROBLEM — R47.1 DYSARTHRIA: Status: ACTIVE | Noted: 2021-08-16

## 2022-06-30 PROBLEM — M51.37 DEGENERATION OF LUMBAR OR LUMBOSACRAL INTERVERTEBRAL DISC: Status: ACTIVE | Noted: 2017-10-02

## 2022-06-30 PROCEDURE — G8419 CALC BMI OUT NRM PARAM NOF/U: HCPCS | Performed by: FAMILY MEDICINE

## 2022-06-30 PROCEDURE — 1036F TOBACCO NON-USER: CPT | Performed by: FAMILY MEDICINE

## 2022-06-30 PROCEDURE — G8427 DOCREV CUR MEDS BY ELIG CLIN: HCPCS | Performed by: FAMILY MEDICINE

## 2022-06-30 PROCEDURE — 3017F COLORECTAL CA SCREEN DOC REV: CPT | Performed by: FAMILY MEDICINE

## 2022-06-30 PROCEDURE — 99215 OFFICE O/P EST HI 40 MIN: CPT | Performed by: FAMILY MEDICINE

## 2022-06-30 RX ORDER — SUMATRIPTAN 25 MG/1
TABLET, FILM COATED ORAL
COMMUNITY
End: 2022-06-30

## 2022-06-30 RX ORDER — QUETIAPINE FUMARATE 100 MG/1
100 TABLET, FILM COATED ORAL NIGHTLY
Qty: 90 TABLET | Refills: 0 | Status: SHIPPED | OUTPATIENT
Start: 2022-06-30 | End: 2022-09-29

## 2022-06-30 RX ORDER — ATORVASTATIN CALCIUM 10 MG/1
10 TABLET, FILM COATED ORAL DAILY
COMMUNITY
Start: 2022-03-24 | End: 2022-09-06 | Stop reason: SDUPTHER

## 2022-06-30 RX ORDER — QUETIAPINE FUMARATE 50 MG/1
TABLET, FILM COATED ORAL
COMMUNITY
Start: 2022-05-05 | End: 2022-06-30

## 2022-06-30 ASSESSMENT — PATIENT HEALTH QUESTIONNAIRE - PHQ9
SUM OF ALL RESPONSES TO PHQ9 QUESTIONS 1 & 2: 0
6. FEELING BAD ABOUT YOURSELF - OR THAT YOU ARE A FAILURE OR HAVE LET YOURSELF OR YOUR FAMILY DOWN: 0
9. THOUGHTS THAT YOU WOULD BE BETTER OFF DEAD, OR OF HURTING YOURSELF: 0
SUM OF ALL RESPONSES TO PHQ QUESTIONS 1-9: 1
8. MOVING OR SPEAKING SO SLOWLY THAT OTHER PEOPLE COULD HAVE NOTICED. OR THE OPPOSITE, BEING SO FIGETY OR RESTLESS THAT YOU HAVE BEEN MOVING AROUND A LOT MORE THAN USUAL: 0
SUM OF ALL RESPONSES TO PHQ QUESTIONS 1-9: 1
7. TROUBLE CONCENTRATING ON THINGS, SUCH AS READING THE NEWSPAPER OR WATCHING TELEVISION: 0
3. TROUBLE FALLING OR STAYING ASLEEP: 0
5. POOR APPETITE OR OVEREATING: 0
2. FEELING DOWN, DEPRESSED OR HOPELESS: 0
SUM OF ALL RESPONSES TO PHQ QUESTIONS 1-9: 1
1. LITTLE INTEREST OR PLEASURE IN DOING THINGS: 0
4. FEELING TIRED OR HAVING LITTLE ENERGY: 1
SUM OF ALL RESPONSES TO PHQ QUESTIONS 1-9: 1
10. IF YOU CHECKED OFF ANY PROBLEMS, HOW DIFFICULT HAVE THESE PROBLEMS MADE IT FOR YOU TO DO YOUR WORK, TAKE CARE OF THINGS AT HOME, OR GET ALONG WITH OTHER PEOPLE: 0

## 2022-06-30 NOTE — PROGRESS NOTES
PROGRESS NOTE  Date of Service:  2022    SUBJECTIVE:  Patient ID: Zoltan Chapa is a 50 y.o. female    HPI:     Hyperlipidemia-was placed on atorvastatin 10 mg after her stroke in . No difficulty with medication    Bipolar disorder-has been on Seroquel 100 mg and finds this stabilizes her mood overall. She does report a history of ADHD and would like to go back on medication in the future. CVA- -had left-sided weakness and slurred speech treated with tPA with resolution of symptoms      Back issues-has had multiple surgeries with fusions lumbar and cervical area    She has varicose veins in her left leg which are causing pain and swelling    She reports she had a port placed after her C-spine surgery in  secondary to poor venous access  Migraine history- has used Imitrex in the past with good relief      Past Medical History:   Diagnosis Date    Acute CVA (cerebrovascular accident) ([address] 2021    Last Assessment & Plan:  Formatting of this note might be different from the original. 71-year-old female admitted for acute CVA. CT head and CTA head and neck negative Stroke neuro consulted from the ED.  NIH 5. TPA running Critical care neurology consulted MRI, TTE with bubble study in a.m.  PT/OT/speech therapy N.p.o. pending swallow study Lipid panel, A1c ordered Start aspirin 81 mg    DDD (degenerative disc disease), lumbar     L4-5, L5-S1    Degeneration of lumbar or lumbosacral intervertebral disc 10/2/2017    Depression with anxiety     Drug abuse ([address]     GI bleed 2016    HNP (herniated nucleus pulposus), lumbar 2018    Lumbar radiculopathy 2019    Mitral valve prolapse     Pulmonary emboli ([address] 8118M1    after angio - smoking    Pyelonephritis 2017    right    SVT (supraventricular tachycardia) ([address]      Past Surgical History:   Procedure Laterality Date    APPENDECTOMY      BACK SURGERY       SECTION      DILATION AND CURETTAGE OF UTERUS  2012    ELBOW SURGERY Right 01/2017    Dr. Chapo Perez (CERVIX STATUS UNKNOWN)      HYSTERECTOMY, TOTAL ABDOMINAL (CERVIX REMOVED)  07/2016    LUMBAR SPINE SURGERY  2003,2005    L5-S1    UPPER GASTROINTESTINAL ENDOSCOPY  2008    WRIST SURGERY  06/2016          Social History     Tobacco Use    Smoking status: Never Smoker    Smokeless tobacco: Never Used   Substance Use Topics    Alcohol use: No      Family History   Problem Relation Age of Onset    Drug Abuse Mother     Hypertension Mother     Hypertension Sister     Depression Sister     Drug Abuse Brother     Drug Abuse Brother     Breast Cancer Maternal Aunt      Current Outpatient Medications on File Prior to Visit   Medication Sig Dispense Refill    atorvastatin (LIPITOR) 10 MG tablet Take 10 mg by mouth daily      Butalbital-APAP-Caffeine (FIORICET PO) Take by mouth as needed       No current facility-administered medications on file prior to visit. Allergies   Allergen Reactions    Morphine Shortness Of Breath, Anaphylaxis, Itching, Nausea And Vomiting and Rash     Itching & SOB  Shortness of breathe  Pt states she 'can't breathe'      Morphine             OBJECTIVE:  Vitals:    06/30/22 0926   BP: 128/80   Site: Right Upper Arm   Position: Sitting   Cuff Size: Large Adult   Pulse: 91   Resp: 18   Temp: 97 °F (36.1 °C)   TempSrc: Temporal   SpO2: 98%   Weight: 181 lb 6.4 oz (82.3 kg)   Height: 5' 8\" (1.727 m)      Body mass index is 27.58 kg/m². Physical Exam  Constitutional:       Appearance: Normal appearance. HENT:      Head: Normocephalic and atraumatic. Eyes:      General: No scleral icterus. Conjunctiva/sclera: Conjunctivae normal.   Cardiovascular:      Rate and Rhythm: Normal rate and regular rhythm. Heart sounds: Normal heart sounds. Pulmonary:      Breath sounds: Normal breath sounds. Neurological:      General: No focal deficit present. Mental Status: She is alert and oriented to person, place, and time. Psychiatric:         Attention and Perception: Attention and perception normal.         Mood and Affect: Mood and affect normal.         Speech: Speech normal.         Behavior: Behavior normal. Behavior is cooperative. Thought Content: Thought content normal.         Cognition and Memory: Cognition and memory normal.         Judgment: Judgment normal.         ASSESSMENT:  1. Bipolar affective disorder, remission status unspecified ([facility]    2. Attention deficit hyperactivity disorder (ADHD), combined type    3. Varicose veins of left lower extremity with pain    4. Encounter for screening mammogram for breast cancer         PLAN:   1. Bipolar affective disorder, remission status unspecified ([facility])  -     QUEtiapine (SEROQUEL) 100 MG tablet; Take 1 tablet by mouth nightly, Disp-90 tablet, R-0Normal  -     External Referral to Psychiatry  2. Attention deficit hyperactivity disorder (ADHD), combined type  -     External Referral to Psychiatry  3. Varicose veins of left lower extremity with pain  -     Ambulatory referral to Vascular Surgery  4. Encounter for screening mammogram for breast cancer  -     [facility] CANDICE DIGITAL SCREEN BILATERAL; Future  Completed review of chart  Refilled her Seroquel today so that she is not off medication but do recommend appointment with psychiatry for evaluation and management of bipolar and attention deficit disorder      Return for annual physical with fasting labs. Approximately 40 minutes of time were spent in preparation, direct patient contact, care coordination, documentation and activities otherwise related to this encounter. Electronically signed by Ke Mitchell MD on 6/30/22 at 7:19 AM.     Please note this chart was generated using dragon dictation software. Although every effort was made to ensure the accuracy of this automated transcription, some errors in transcription may have occurred.

## 2022-07-06 PROBLEM — M51.37 DEGENERATION OF LUMBAR OR LUMBOSACRAL INTERVERTEBRAL DISC: Status: RESOLVED | Noted: 2017-10-02 | Resolved: 2022-07-06

## 2022-07-06 PROBLEM — I63.9 ACUTE CEREBROVASCULAR ACCIDENT (CVA) (HCC): Status: RESOLVED | Noted: 2021-08-16 | Resolved: 2022-07-06

## 2022-07-06 PROBLEM — M51.379 DEGENERATION OF LUMBAR OR LUMBOSACRAL INTERVERTEBRAL DISC: Status: RESOLVED | Noted: 2017-10-02 | Resolved: 2022-07-06

## 2022-07-06 PROBLEM — M51.26 HNP (HERNIATED NUCLEUS PULPOSUS), LUMBAR: Status: RESOLVED | Noted: 2018-05-11 | Resolved: 2022-07-06

## 2022-07-06 PROBLEM — M54.16 LUMBAR RADICULOPATHY: Status: RESOLVED | Noted: 2019-04-30 | Resolved: 2022-07-06

## 2022-07-06 PROBLEM — I63.9 ACUTE CVA (CEREBROVASCULAR ACCIDENT) (HCC): Status: RESOLVED | Noted: 2021-08-16 | Resolved: 2022-07-06

## 2022-07-08 PROBLEM — F19.10 DRUG ABUSE (HCC): Status: ACTIVE | Noted: 2022-01-21

## 2022-07-08 PROBLEM — N83.201 CYST OF RIGHT OVARY: Status: ACTIVE | Noted: 2022-01-21

## 2022-07-19 ENCOUNTER — OFFICE VISIT (OUTPATIENT)
Dept: VASCULAR SURGERY | Age: 52
End: 2022-07-19
Payer: COMMERCIAL

## 2022-07-19 VITALS
TEMPERATURE: 98 F | DIASTOLIC BLOOD PRESSURE: 89 MMHG | HEIGHT: 68 IN | WEIGHT: 176 LBS | BODY MASS INDEX: 26.67 KG/M2 | HEART RATE: 91 BPM | SYSTOLIC BLOOD PRESSURE: 142 MMHG

## 2022-07-19 DIAGNOSIS — I83.813 VARICOSE VEINS OF BOTH LOWER EXTREMITIES WITH PAIN: Primary | ICD-10-CM

## 2022-07-19 DIAGNOSIS — I83.812 VARICOSE VEINS OF LOWER EXTREMITY WITH PAIN, LEFT: Primary | ICD-10-CM

## 2022-07-19 PROCEDURE — 99203 OFFICE O/P NEW LOW 30 MIN: CPT | Performed by: SURGERY

## 2022-07-19 NOTE — PROGRESS NOTES
[facility] Vascular Surgery  Mone Vaca MD, ARENDAL, [address]    OUTPATIENT CONSULTATION    Date of Consultation:  7/19/2022    PCP:  Farrah Ludwig MD       Chief Complaint: Varicose veins    History of Present Illness:   Sherwin Peters is a 50 y.o. female who presents today for evaluation management of varicose veins. Reports a long history of varicosities. Reports a history of DVT and PE in the 1997. States that she recently presented to  for evaluation of leg pain. She was given a dose of Lovenox for presumed SVT. No ultrasound was performed due to after hours. D-dimer was only very mildly elevated at 0.67. Reports increased pain associated with the left leg varicosities. She wears a light compression, unsure of the strength. Reports that she stands quite a bit while working. She denies any chest pain, shortness of breath or ESCOBAR. She now presents for further evaluation and management. Past Medical History:  Past Medical History:   Diagnosis Date    Acute CVA (cerebrovascular accident) ([address] 08/16/2021    Last Assessment & Plan:  Formatting of this note might be different from the original. 43-year-old female admitted for acute CVA. CT head and CTA head and neck negative Stroke neuro consulted from the ED.  NIH 5. TPA running Critical care neurology consulted MRI, TTE with bubble study in a.m.  PT/OT/speech therapy N.p.o. pending swallow study Lipid panel, A1c ordered Start aspirin 81 mg    Cyst of right ovary 1/21/2022    DDD (degenerative disc disease), lumbar     L4-5, L5-S1    Degeneration of lumbar or lumbosacral intervertebral disc 10/2/2017    Depression with anxiety     Drug abuse ([address] 2014    GI bleed 02/2016    HNP (herniated nucleus pulposus), lumbar 5/11/2018    Lumbar radiculopathy 4/30/2019    Mitral valve prolapse     Pulmonary emboli ([address] 3471L6    after angio - smoking    Pyelonephritis 09/2017    right    SVT (supraventricular tachycardia) [facility])        Past Surgical History:  Past Surgical History:   Procedure Laterality Date    APPENDECTOMY  2008    BACK SURGERY       SECTION      DILATION AND CURETTAGE OF UTERUS  2012    ELBOW SURGERY Right 2017    Dr. Taisha Phillips (CERVIX STATUS UNKNOWN)      HYSTERECTOMY, TOTAL ABDOMINAL (CERVIX REMOVED)  2016    LUMBAR SPINE SURGERY  ,    L5-S1    UPPER GASTROINTESTINAL ENDOSCOPY  2008    WRIST SURGERY  2016           Home Medications:   Prior to Admission medications    Medication Sig Start Date End Date Taking?  Authorizing Provider   atorvastatin (LIPITOR) 10 MG tablet Take 10 mg by mouth daily 3/24/22   Historical Provider, MD   QUEtiapine (SEROQUEL) 100 MG tablet Take 1 tablet by mouth nightly 22   Declan Oliver MD   Butalbital-APAP-Caffeine (FIORICET PO) Take by mouth as needed  Patient not taking: Reported on 2022    Historical Provider, MD        Allergies:  Morphine and Morphine     Social History:    Social History     Socioeconomic History    Marital status: Single     Spouse name: Not on file    Number of children: Not on file    Years of education: Not on file    Highest education level: Not on file   Occupational History    Not on file   Tobacco Use    Smoking status: Never    Smokeless tobacco: Never   Substance and Sexual Activity    Alcohol use: No    Drug use: No    Sexual activity: Yes     Partners: Male   Other Topics Concern    Not on file   Social History Narrative    ** Merged History Encounter **          Social Determinants of Health     Financial Resource Strain: Low Risk     Difficulty of Paying Living Expenses: Not hard at all   Food Insecurity: No Food Insecurity    Worried About Running Out of Food in the Last Year: Never true    Ran Out of Food in the Last Year: Never true   Transportation Needs: Not on file   Physical Activity: Not on file   Stress: Not on file   Social Connections: Not on file   Intimate Partner Violence: Not on file   Housing Stability: Not on file     Review of Systems:  A comprehensive review of systems was otherwise negative except for that which was stated in the HPI. PhysicalExamination:    Vitals:    07/19/22 0946   BP: (!) 142/89   Pulse: 91   Temp: 98 °F (36.7 °C)   Weight: 176 lb (79.8 kg)   Height: 5' 8\" (1.727 m)     Body mass index is 26.76 kg/m². Physical Exam  General:  AAO x 3. NAD. WD/WN. Very pleasant  Heart:  RRR no m/r/g  Lungs:  CTA B no w/r/r  Extremities:  BLE warm and well-perfused with no C/C/E. Minimal edema. No pitting. No significant stasis discoloration or stasis dermatitis. She has a cluster of moderate sized varicosities in the left medial distal half of the thigh and proximal calf. These are quite soft without overlying erythema or induration; however, reports exquisite tenderness to palpation. Vascular:  B DP and PT 2+ palpable. Skin:  warm, dry, no rash, no jaundice. Diagnosis:     Painful left leg varicose veins    Plan:   Check venous reflux study to evaluate further. Recommend continued use of compression stockings, at least 20 to 30 mmHg knee-high's. A prescription was given. I will see her back in the next week or so, once her study has been completed.

## 2022-07-26 ENCOUNTER — PROCEDURE VISIT (OUTPATIENT)
Dept: VASCULAR SURGERY | Age: 52
End: 2022-07-26

## 2022-07-26 DIAGNOSIS — I83.812 VARICOSE VEINS OF LOWER EXTREMITY WITH PAIN, LEFT: Primary | ICD-10-CM

## 2022-07-26 DIAGNOSIS — I83.812 VARICOSE VEINS OF LOWER EXTREMITY WITH PAIN, LEFT: ICD-10-CM

## 2022-07-26 DIAGNOSIS — I83.813 VARICOSE VEINS OF BOTH LOWER EXTREMITIES WITH PAIN: ICD-10-CM

## 2022-07-29 ENCOUNTER — TELEPHONE (OUTPATIENT)
Dept: VASCULAR SURGERY | Age: 52
End: 2022-07-29

## 2022-08-02 NOTE — RESULT ENCOUNTER NOTE
Please let her know there is no evidence of \"blood clots\". She has some venous insufficiency, which would be treated with the stockings we discussed.

## 2022-08-03 ENCOUNTER — TELEPHONE (OUTPATIENT)
Dept: VASCULAR SURGERY | Age: 52
End: 2022-08-03

## 2022-08-03 NOTE — TELEPHONE ENCOUNTER
----- Message from Chante Skinner MD sent at 8/2/2022  3:03 PM EDT -----  Please let her know there is no evidence of \"blood clots\". She has some venous insufficiency, which would be treated with the stockings we discussed.

## 2022-08-03 NOTE — TELEPHONE ENCOUNTER
Informed pt of results. Pt states she has been wearing the compression stocking consistently for 2 wks and hasnt noticed any improvement. She is concerned bc the legs are still swollen and painful.

## 2022-08-19 ENCOUNTER — NURSE TRIAGE (OUTPATIENT)
Dept: OTHER | Facility: CLINIC | Age: 52
End: 2022-08-19

## 2022-08-19 NOTE — TELEPHONE ENCOUNTER
Received call from Lorenzo at [facility] with [employer]. Subjective: Caller states \"Eye swelling\"     Current Symptoms: Eye watering and swelling, blurry, right eye is worse than the left. Swells about once a week, pain with the swelling, swells so much the eye is completely closed  Nasal congestion - green mucous  Pain in the right side of the face yesterday, today pain in gums    Onset: 2 days ago for the eye swelling    Pain Severity:  Mild pain in the eyes when they swell, not as much today because they are better  than a couple days ago    Temperature: Yesterday had temp nothing today     What has been tried: Increased rest, Benadryl    History related to the current reason for call: Problem list reviewed and no current problems related to this reason for call    Recommended disposition: See in Office Today    Care advice provided, patient verbalizes understanding; denies any other questions or concerns; instructed to call back for any new or worsening symptoms. Patient/Caller agrees with recommended disposition; writer provided warm transfer to [facility] at [facility] for appointment scheduling     Attention Provider: Thank you for allowing me to participate in the care of your patient. The patient was connected to triage in response to information provided to the ECC/PSC. Please do not respond through this encounter as the response is not directed to a shared pool.       Reason for Disposition   MODERATE to SEVERE eyelid swelling on one side  (Exception: Due to a mosquito bite.)    Protocols used: Eye - Swelling-ADULT-[place]

## 2022-09-02 RX ORDER — QUETIAPINE FUMARATE 100 MG/1
100 TABLET, FILM COATED ORAL NIGHTLY
Qty: 30 TABLET | Refills: 0 | Status: SHIPPED | OUTPATIENT
Start: 2022-09-02 | End: 2022-09-13

## 2022-09-02 NOTE — TELEPHONE ENCOUNTER
Patient called and stated that she left her medication at the hotel she was staying at. When she called them back the hotel said they didn't have it however she is positive because it was in a small bag with her vitamins. She stated that she would call back if she finds them.  The medications she misplaced are   atorvastatin (LIPITOR) 10 MG tablet [[id number]]     QUEtiapine (SEROQUEL) 100 MG tablet [[id number]]

## 2022-09-06 ENCOUNTER — TELEPHONE (OUTPATIENT)
Dept: PRIMARY CARE CLINIC | Age: 52
End: 2022-09-06

## 2022-09-06 RX ORDER — ATORVASTATIN CALCIUM 10 MG/1
10 TABLET, FILM COATED ORAL DAILY
Qty: 90 TABLET | Refills: 0 | Status: SHIPPED | OUTPATIENT
Start: 2022-09-06 | End: 2022-09-07 | Stop reason: SDUPTHER

## 2022-09-06 NOTE — TELEPHONE ENCOUNTER
----- Message from Mera Byrnes MD sent at 9/6/2022 12:04 PM EDT -----  Regarding: Refill request today  Okay the refill request for her atorvastatin but she is due for annual blood work and so she should come fasting to her next appointment. She is concerned about change of symptoms she should make an appointment to discuss.

## 2022-09-06 NOTE — TELEPHONE ENCOUNTER
Seroquel was sent to the pharmacy on 09/02. Atorvastatin was a historical provider. Patient requesting 10mg Atorvastatin. Refill request sent to Dr. Candice Mai. When speaking to patient, she is noticing reactions to situations are becoming more intense. She is not sleeping as well as she was, and she \"does not feel herself\". She is asking to move up on Seroquel dosing very minimally to see if she notices any improvement. She did note that she has been on this medication for 7+  years at the same dosing, and is aware when she does not feel herself. Please advise.

## 2022-09-07 RX ORDER — ATORVASTATIN CALCIUM 10 MG/1
10 TABLET, FILM COATED ORAL DAILY
Qty: 90 TABLET | Refills: 0 | Status: SHIPPED | OUTPATIENT
Start: 2022-09-07

## 2022-09-07 NOTE — TELEPHONE ENCOUNTER
Patient has scheduled a PE for 9/13 and her atrovastin was sent in error to [address] [place] [address]- I have re-pended it to Marcella Del Valle here in [place].

## 2022-09-12 NOTE — PROGRESS NOTES
PROGRESS NOTE  Date of Service:  9/13/2022    SUBJECTIVE:  Patient ID: Kristina Shepherd is a 50 y.o. female    ASSESSMENT  No diagnosis found. PLAN:   {There are no diagnoses linked to this encounter. (Refresh or delete this SmartLink)}     No follow-ups on file. HPI:       Hyperlipidemia-was placed on atorvastatin 10 mg after her stroke in 2021. No difficulty with medication    Bipolar disorder-has been on Seroquel 100 mg and finds this stabilizes her mood overall. She does report a history of ADHD and would like to go back on medication in the future. CVA- 2021-had left-sided weakness and slurred speech treated with tPA with resolution of symptoms      Back issues-has had multiple surgeries with fusions lumbar and cervical area    She has varicose veins in her left leg which are causing pain and swelling    She reports she had a port placed after her C-spine surgery in 2019 secondary to poor venous access  Migraine history- has used Imitrex in the past with good relief    Patient's medications, allergies, past medical, surgical, social and family histories were reviewed and updated as appropriate. Review of Systems    OBJECTIVE:  There were no vitals filed for this visit. There is no height or weight on file to calculate BMI. Physical Exam        Electronically signed by Yuri Lorenzana MD on 9/13/2022 at 7:49 PM.    Please note this chart was generated using dragon dictation software. Although every effort was made to ensure the accuracy of this automated transcription, some errors in transcription may have occurred.

## 2022-09-13 ENCOUNTER — OFFICE VISIT (OUTPATIENT)
Dept: PRIMARY CARE CLINIC | Age: 52
End: 2022-09-13
Payer: MEDICAID

## 2022-09-13 VITALS
DIASTOLIC BLOOD PRESSURE: 88 MMHG | HEART RATE: 100 BPM | SYSTOLIC BLOOD PRESSURE: 136 MMHG | BODY MASS INDEX: 27.46 KG/M2 | TEMPERATURE: 96.1 F | RESPIRATION RATE: 16 BRPM | HEIGHT: 68 IN | OXYGEN SATURATION: 98 % | WEIGHT: 181.2 LBS

## 2022-09-13 DIAGNOSIS — F31.9 BIPOLAR AFFECTIVE DISORDER, REMISSION STATUS UNSPECIFIED (HCC): ICD-10-CM

## 2022-09-13 DIAGNOSIS — F51.05 INSOMNIA DUE TO OTHER MENTAL DISORDER: ICD-10-CM

## 2022-09-13 DIAGNOSIS — G89.29 CHRONIC BILATERAL LOW BACK PAIN WITHOUT SCIATICA: ICD-10-CM

## 2022-09-13 DIAGNOSIS — M54.50 CHRONIC BILATERAL LOW BACK PAIN WITHOUT SCIATICA: ICD-10-CM

## 2022-09-13 DIAGNOSIS — F99 INSOMNIA DUE TO OTHER MENTAL DISORDER: ICD-10-CM

## 2022-09-13 DIAGNOSIS — Z00.00 EXAMINATION, MEDICAL, GENERAL: Primary | ICD-10-CM

## 2022-09-13 PROCEDURE — 99396 PREV VISIT EST AGE 40-64: CPT | Performed by: FAMILY MEDICINE

## 2022-09-13 ASSESSMENT — PATIENT HEALTH QUESTIONNAIRE - PHQ9
10. IF YOU CHECKED OFF ANY PROBLEMS, HOW DIFFICULT HAVE THESE PROBLEMS MADE IT FOR YOU TO DO YOUR WORK, TAKE CARE OF THINGS AT HOME, OR GET ALONG WITH OTHER PEOPLE: 1
8. MOVING OR SPEAKING SO SLOWLY THAT OTHER PEOPLE COULD HAVE NOTICED. OR THE OPPOSITE, BEING SO FIGETY OR RESTLESS THAT YOU HAVE BEEN MOVING AROUND A LOT MORE THAN USUAL: 0
SUM OF ALL RESPONSES TO PHQ QUESTIONS 1-9: 6
3. TROUBLE FALLING OR STAYING ASLEEP: 0
6. FEELING BAD ABOUT YOURSELF - OR THAT YOU ARE A FAILURE OR HAVE LET YOURSELF OR YOUR FAMILY DOWN: 0
5. POOR APPETITE OR OVEREATING: 1
SUM OF ALL RESPONSES TO PHQ QUESTIONS 1-9: 6
2. FEELING DOWN, DEPRESSED OR HOPELESS: 1
SUM OF ALL RESPONSES TO PHQ9 QUESTIONS 1 & 2: 1
SUM OF ALL RESPONSES TO PHQ QUESTIONS 1-9: 6
7. TROUBLE CONCENTRATING ON THINGS, SUCH AS READING THE NEWSPAPER OR WATCHING TELEVISION: 2
SUM OF ALL RESPONSES TO PHQ QUESTIONS 1-9: 6
4. FEELING TIRED OR HAVING LITTLE ENERGY: 2
9. THOUGHTS THAT YOU WOULD BE BETTER OFF DEAD, OR OF HURTING YOURSELF: 0
1. LITTLE INTEREST OR PLEASURE IN DOING THINGS: 0

## 2022-09-13 NOTE — PROGRESS NOTES
9/13/2022    Silvia Esquivel is a 50 y.o. female, here for a preventive medicine evaluation. She also has concerns of back pain and difficulty sleeping        Hyperlipidemia-was placed on atorvastatin 10 mg after her stroke in 2021. No difficulty with medication    Bipolar disorder-has been on Seroquel 100 mg and finds this stabilizes her mood overall. She takes Seroquel at night and has noted recent difficulty falling asleep. She reports she has taken GeneSight testing in the past  Effexor and Prozac taken previously were not effective    CVA- 2021-had left-sided weakness and slurred speech treated with tPA with resolution of symptoms      Back issues-has had multiple surgeries with fusions lumbar and cervical area  Was previously seeing a pain management group but reports they only offered pain medication, gabapentin, muscle relaxants and she did not want to continue this. She had onset yesterday of severe back pain  Has seen Ortho in the past with multiple procedures  She prefers not to go back to a pain clinic because she does not want to be started on pain medicine. Denies bowel or bladder incontinence  Occasional radiculopathy but not always  No weakness in her lower legs    She has varicose veins in her left leg which are causing pain and swelling    She reports she had a port placed after her C-spine surgery in 2019 secondary to poor venous access    Migraine history- has used Imitrex in the past with good relief    Allergies   Allergen Reactions    Morphine Shortness Of Breath, Anaphylaxis, Itching, Nausea And Vomiting and Rash     Itching & SOB  Shortness of breathe  Pt states she 'can't breathe'      Morphine      Past Medical History:   Diagnosis Date    Acute CVA (cerebrovascular accident) ([facility] 08/16/2021    Last Assessment & Plan:  Formatting of this note might be different from the original. 68-year-old female admitted for acute CVA.   CT head and CTA head and neck negative Stroke neuro consulted from the ED.  [facility] 5. TPA running Critical care neurology consulted MRI, TTE with bubble study in a.m. PT/OT/speech therapy N.p.o. pending swallow study Lipid panel, A1c ordered Start aspirin 81 mg    Cyst of right ovary 2022    DDD (degenerative disc disease), lumbar     L4-5, L5-S1    Degeneration of lumbar or lumbosacral intervertebral disc 10/2/2017    Depression with anxiety     Drug abuse ([address]     GI bleed 2016    HNP (herniated nucleus pulposus), lumbar 2018    Lumbar radiculopathy 2019    Mitral valve prolapse     Pulmonary emboli ([address] 5716P1    after angio - smoking    Pyelonephritis 2017    right    SVT (supraventricular tachycardia) ([address]      Past Surgical History:   Procedure Laterality Date    APPENDECTOMY  2008    BACK SURGERY       SECTION      DILATION AND CURETTAGE OF UTERUS  2012    ELBOW SURGERY Right 2017    Dr. Megan Finley (CERVIX STATUS UNKNOWN)      HYSTERECTOMY, TOTAL ABDOMINAL (CERVIX REMOVED)  2016    LUMBAR SPINE SURGERY  ,    L5-S1    UPPER GASTROINTESTINAL ENDOSCOPY  2008    WRIST SURGERY  2016    [facility]     Family History   Problem Relation Age of Onset    Drug Abuse Mother     Hypertension Mother     Hypertension Sister     Depression Sister     Drug Abuse Brother     Drug Abuse Brother     Breast Cancer Maternal Aunt        Review of Systems         Vitals:    22 0951   BP: 136/88   Site: Right Upper Arm   Position: Sitting   Cuff Size: Large Adult   Pulse: 100   Resp: 16   Temp: (!) 96.1 °F (35.6 °C)   TempSrc: Temporal   SpO2: 98%   Weight: 181 lb 3.2 oz (82.2 kg)   Height: 5' 8\" (1.727 m)     Estimated body mass index is 27.55 kg/m² as calculated from the following:    Height as of this encounter: 5' 8\" (1.727 m). Weight as of this encounter: 181 lb 3.2 oz (82.2 kg). Physical Exam  Vitals reviewed. Constitutional:       Appearance: Normal appearance.    HENT: Head: Normocephalic and atraumatic. Right Ear: Tympanic membrane, ear canal and external ear normal.      Left Ear: Tympanic membrane, ear canal and external ear normal.   Eyes:      General: No scleral icterus. Extraocular Movements: Extraocular movements intact. Conjunctiva/sclera: Conjunctivae normal.      Pupils: Pupils are equal, round, and reactive to light. Neck:      Thyroid: No thyroid mass, thyromegaly or thyroid tenderness. Cardiovascular:      Rate and Rhythm: Normal rate and regular rhythm. Pulses: Normal pulses. Heart sounds: Normal heart sounds. Pulmonary:      Effort: Pulmonary effort is normal.      Breath sounds: Normal breath sounds. No wheezing, rhonchi or rales. Abdominal:      Palpations: Abdomen is soft. Tenderness: There is no abdominal tenderness. There is no guarding or rebound. Musculoskeletal:      Cervical back: Neck supple. No rigidity. No muscular tenderness. Right lower leg: No edema. Left lower leg: No edema. Lymphadenopathy:      Cervical: No cervical adenopathy. Skin:     General: Skin is warm and dry. Findings: No rash. Neurological:      General: No focal deficit present. Mental Status: She is alert and oriented to person, place, and time. Cranial Nerves: No cranial nerve deficit. Sensory: No sensory deficit. Psychiatric:         Mood and Affect: Mood normal.         Behavior: Behavior normal.         Thought Content: Thought content normal.         Judgment: Judgment normal.       No flowsheet data found.       The 10-year ASCVD risk score (Huey Rae et al., 2013) is: 1%    Values used to calculate the score:      Age: 50 years      Sex: Female      Is Non- : No      Diabetic: No      Tobacco smoker: No      Systolic Blood Pressure: 129 mmHg      Is BP treated: No      HDL Cholesterol: 56 mg/dL      Total Cholesterol: 164 mg/dL  Immunization History   Administered Date(s) Administered    Influenza Virus Vaccine 01/27/2019    Tdap (Boostrix, Adacel) 01/26/2019, 01/26/2019     Health Maintenance   Topic Date Due    COVID-19 Vaccine (1) Never done    HIV screen  Never done    Hepatitis C screen  Never done    Breast cancer screen  06/29/2022    Shingles vaccine (1 of 2) Never done    Flu vaccine (1) 09/01/2022    Lipids  08/17/2022    Depression Monitoring  06/30/2023    Diabetes screen  08/16/2024    Colorectal Cancer Screen  02/09/2026    DTaP/Tdap/Td vaccine (2 - Td or Tdap) 01/26/2029    Hepatitis A vaccine  Aged Out    Hepatitis B vaccine  Aged Out    Hib vaccine  Aged Out    Meningococcal (ACWY) vaccine  Aged Out    Pneumococcal 0-64 years Vaccine  Aged Out       ASSESSMENT:  1. Examination, medical, general    2. Chronic bilateral low back pain without sciatica    3. Bipolar affective disorder, remission status unspecified ([facility]    4. Insomnia due to other mental disorder         PLAN:  1. Examination, medical, general  Patient will go to [facility] center to have labs drawn through her port  Will adjust atorvastatin if needed  - Comprehensive Metabolic Panel; Future  - Lipid Panel; Future  - Hepatitis C Antibody; Future  - CBC with Auto Differential; Future  - TSH with Reflex; Future    2. Chronic bilateral low back pain without sciatica  Extensive history of back pain, procedures and treatment modalities in the past.  Do recommend second opinion with [facility] [address]    3. Bipolar affective disorder, remission status unspecified ([facility]  Asked her to bring in her GeneSight testing results in order to review for other medication options    4. Insomnia due to other mental disorder  When she brings in the GeneSight testing can determine other medications that might be helpful. Do not drink anything with caffeine after noon. Try to get at least 30 minutes of physical activity each day.   Ensure your bedroom is cool, dark, quiet and bedding is comfortable. Avoid watching TV or use of other electronic devices for at least 1 hour before going to bed. Go to bed at the same time every night. Consider [email or web address] an online sleep improvement program.      Recommend cold vaccine, Shingrix vaccine and annual flu vaccine. Patient declines    Return for as needed. An electronic signature was used to authenticate this note.     --Maykel López MD on 9/13/2022 at 11:05 AM

## 2022-09-29 DIAGNOSIS — F31.9 BIPOLAR AFFECTIVE DISORDER, REMISSION STATUS UNSPECIFIED (HCC): ICD-10-CM

## 2022-09-29 RX ORDER — QUETIAPINE FUMARATE 100 MG/1
TABLET, FILM COATED ORAL
Qty: 90 TABLET | Refills: 0 | Status: SHIPPED | OUTPATIENT
Start: 2022-09-29

## 2022-09-29 NOTE — TELEPHONE ENCOUNTER
Medication:   Requested Prescriptions     Pending Prescriptions Disp Refills    QUEtiapine (SEROQUEL) 100 MG tablet [Pharmacy Med Name: QUEtiapine FUMARATE 100 MG TAB] 30 tablet      Sig: TAKE ONE TABLET BY MOUTH AT BEDTIME        Last Filled:  06/30/2022 #90 with no refills     Patient Phone Number: [phone] (home)     Last appt: 9/13/2022   Next appt: Visit date not found    Last OARRS:   RX Monitoring 6/28/2017   Attestation The Prescription Monitoring Report for this patient was reviewed today. Periodic Controlled Substance Monitoring Possible medication side effects, risk of tolerance and/or dependence, and alternative treatments discussed; No signs of potential drug abuse or diversion identified.

## 2022-10-02 ENCOUNTER — TELEPHONE (OUTPATIENT)
Dept: PRIMARY CARE CLINIC | Age: 52
End: 2022-10-02

## 2022-10-02 NOTE — PROGRESS NOTES
Patient called on-call today. She has been having dizziness since Friday. She is also noticing she is having a hard time finding her words. She said the dizziness is gotten worse it is worse when she lays on 1 side versus the other. Due to patient's extensive medical history of past CVA recommend patient go to ER.

## 2022-10-04 ENCOUNTER — TELEPHONE (OUTPATIENT)
Dept: PRIMARY CARE CLINIC | Age: 52
End: 2022-10-04

## 2022-10-04 NOTE — TELEPHONE ENCOUNTER
Pt states that the NP sent her to the ED over the weekend. Pt spent 2 days in the hospital.  Pt was diagnosed with Vertigo. Pt is still experiencing nausea. Pt would like to see if the provider could prescribe Meclizine for the nausea. Pt was offered an VV or OV and declined both. Pt went to [address] [place] [address].

## 2022-10-05 ENCOUNTER — TELEMEDICINE (OUTPATIENT)
Dept: PRIMARY CARE CLINIC | Age: 52
End: 2022-10-05
Payer: MEDICAID

## 2022-10-05 DIAGNOSIS — R11.0 NAUSEA: ICD-10-CM

## 2022-10-05 DIAGNOSIS — R51.9 NONINTRACTABLE HEADACHE, UNSPECIFIED CHRONICITY PATTERN, UNSPECIFIED HEADACHE TYPE: ICD-10-CM

## 2022-10-05 DIAGNOSIS — R42 VERTIGO: Primary | ICD-10-CM

## 2022-10-05 PROCEDURE — G8484 FLU IMMUNIZE NO ADMIN: HCPCS | Performed by: STUDENT IN AN ORGANIZED HEALTH CARE EDUCATION/TRAINING PROGRAM

## 2022-10-05 PROCEDURE — 99213 OFFICE O/P EST LOW 20 MIN: CPT | Performed by: STUDENT IN AN ORGANIZED HEALTH CARE EDUCATION/TRAINING PROGRAM

## 2022-10-05 PROCEDURE — 1036F TOBACCO NON-USER: CPT | Performed by: STUDENT IN AN ORGANIZED HEALTH CARE EDUCATION/TRAINING PROGRAM

## 2022-10-05 PROCEDURE — G8419 CALC BMI OUT NRM PARAM NOF/U: HCPCS | Performed by: STUDENT IN AN ORGANIZED HEALTH CARE EDUCATION/TRAINING PROGRAM

## 2022-10-05 PROCEDURE — 3017F COLORECTAL CA SCREEN DOC REV: CPT | Performed by: STUDENT IN AN ORGANIZED HEALTH CARE EDUCATION/TRAINING PROGRAM

## 2022-10-05 PROCEDURE — G8427 DOCREV CUR MEDS BY ELIG CLIN: HCPCS | Performed by: STUDENT IN AN ORGANIZED HEALTH CARE EDUCATION/TRAINING PROGRAM

## 2022-10-05 RX ORDER — TRAMADOL HYDROCHLORIDE 50 MG/1
50 TABLET ORAL EVERY 6 HOURS PRN
Qty: 12 TABLET | Refills: 0 | Status: SHIPPED | OUTPATIENT
Start: 2022-10-05 | End: 2022-10-08

## 2022-10-05 RX ORDER — ONDANSETRON 4 MG/1
4 TABLET, ORALLY DISINTEGRATING ORAL 3 TIMES DAILY PRN
Qty: 21 TABLET | Refills: 0 | Status: SHIPPED | OUTPATIENT
Start: 2022-10-05

## 2022-10-05 RX ORDER — MECLIZINE HCL 12.5 MG/1
12.5 TABLET ORAL 3 TIMES DAILY PRN
COMMUNITY
Start: 2022-10-03 | End: 2022-10-14 | Stop reason: SDUPTHER

## 2022-10-05 ASSESSMENT — ENCOUNTER SYMPTOMS
ABDOMINAL PAIN: 0
NAUSEA: 1
SHORTNESS OF BREATH: 0
WHEEZING: 0

## 2022-10-05 NOTE — ASSESSMENT & PLAN NOTE
Uncontrolled. Patient was instructed to continue with the meclizine for dizziness, try ondansetron for nausea, 3-day course of tramadol for the severe headache.  She has also been referred to PT for vestibular rehab and has been referred to neurologist.

## 2022-10-05 NOTE — PROGRESS NOTES
Mirna Faith (:  1970) is a 50 y.o. female,Established patient, here for evaluation of the following chief complaint(s): Nausea (Pt was admitted to hospital over the weekend, dx'd with vertigo. Was given meclizine still having HA and dizziness and nausea)         ASSESSMENT/PLAN:  1. Vertigo  Assessment & Plan:  Uncontrolled. Orders:  -     ondansetron (ZOFRAN-ODT) 4 MG disintegrating tablet; Take 1 tablet by mouth 3 times daily as needed for Nausea or Vomiting, Disp-21 tablet, R-0Normal  -     traMADol (ULTRAM) 50 MG tablet; Take 1 tablet by mouth every 6 hours as needed for Pain for up to 3 days. Intended supply: 3 days. Take lowest dose possible to manage pain, Disp-12 tablet, R-0Normal  -     [facility] - Driss Ospina MD, Neurology, [address] [facility] [place]  2. Nausea  -     ondansetron (ZOFRAN-ODT) 4 MG disintegrating tablet; Take 1 tablet by mouth 3 times daily as needed for Nausea or Vomiting, Disp-21 tablet, R-0Normal  3. Nonintractable headache, unspecified chronicity pattern, unspecified headache type  -     traMADol (ULTRAM) 50 MG tablet; Take 1 tablet by mouth every 6 hours as needed for Pain for up to 3 days. Intended supply: 3 days. Take lowest dose possible to manage pain, Disp-12 tablet, R-0Normal    Return if symptoms worsen or fail to improve. SUBJECTIVE/OBJECTIVE:  Patient presents for vertigo follow up. She was admitted with initial concerns for stroke. MRI brain, CT head, CTA neck were all unremarkable. She was subsequently diagnosed with vertigo and prescribed meclizine. Today she reports that her symptoms are not getting any better. Still having headache, dizziness, nausea. Dizziness and balanced problems worst on movement. She has tried tylenol, ibuprofen, Fioricet without any improvement in headaches. Of note, her symptoms started 5 days ago and she was sent to [address] [place] [address]. Review of Systems   Constitutional:  Positive for fatigue. Negative for fever. Respiratory:  Negative for shortness of breath and wheezing. Cardiovascular:  Negative for chest pain and palpitations. Gastrointestinal:  Positive for nausea. Negative for abdominal pain. Neurological:  Positive for dizziness and headaches. Patient-Reported Vitals 10/5/2022   Patient-Reported Weight 184 lb   Patient-Reported Height 5 8        Physical Exam  Constitutional:       General: She is not in acute distress. Appearance: Normal appearance. She is not ill-appearing or toxic-appearing. Neurological:      Mental Status: She is alert and oriented to person, place, and time. Psychiatric:         Mood and Affect: Mood normal.         Behavior: Behavior normal.         Vance Garner was evaluated through a synchronous (real-time) audio-video encounter. The patient (or guardian if applicable) is aware that this is a billable service. Verbal consent to proceed was obtained today. The visit was conducted pursuant to the emergency declaration under the [address] [place] [address] authority and the [employer] General Act. Patient identification was verified, and a caregiver was present when appropriate. The patient was located in a state where the provider was credentialed to provide care. This dictation was generated by voice recognition computer software. Although all attempts are made to edit the dictation for accuracy, there may be errors in the transcription that are not intended. An electronic signature was used to authenticate this note.     --Guevara Mccarthy MD

## 2022-10-14 RX ORDER — MECLIZINE HCL 12.5 MG/1
12.5 TABLET ORAL 3 TIMES DAILY PRN
Qty: 30 TABLET | Refills: 0 | Status: SHIPPED | OUTPATIENT
Start: 2022-10-14

## 2022-10-14 NOTE — TELEPHONE ENCOUNTER
Patient was told last week that she was told to go to the ER because of a possible stroke, but it just ended up being Vertigo. Patient is requesting a refill of meclizine (ANTIVERT) 12.5 MG tablet to be sent to [facility]

## 2022-11-08 ENCOUNTER — TELEMEDICINE (OUTPATIENT)
Dept: PRIMARY CARE CLINIC | Age: 52
End: 2022-11-08
Payer: MEDICAID

## 2022-11-08 DIAGNOSIS — H57.89 EYE SWELLING, BILATERAL: Primary | ICD-10-CM

## 2022-11-08 PROCEDURE — 3017F COLORECTAL CA SCREEN DOC REV: CPT | Performed by: FAMILY MEDICINE

## 2022-11-08 PROCEDURE — G8427 DOCREV CUR MEDS BY ELIG CLIN: HCPCS | Performed by: FAMILY MEDICINE

## 2022-11-08 PROCEDURE — 99213 OFFICE O/P EST LOW 20 MIN: CPT | Performed by: FAMILY MEDICINE

## 2022-11-08 RX ORDER — METHYLPREDNISOLONE 4 MG/1
TABLET ORAL
Qty: 1 KIT | Refills: 0 | Status: SHIPPED | OUTPATIENT
Start: 2022-11-08 | End: 2022-11-14

## 2022-11-08 ASSESSMENT — PATIENT HEALTH QUESTIONNAIRE - PHQ9
2. FEELING DOWN, DEPRESSED OR HOPELESS: 0
6. FEELING BAD ABOUT YOURSELF - OR THAT YOU ARE A FAILURE OR HAVE LET YOURSELF OR YOUR FAMILY DOWN: 0
9. THOUGHTS THAT YOU WOULD BE BETTER OFF DEAD, OR OF HURTING YOURSELF: 0
SUM OF ALL RESPONSES TO PHQ QUESTIONS 1-9: 0
SUM OF ALL RESPONSES TO PHQ QUESTIONS 1-9: 0
1. LITTLE INTEREST OR PLEASURE IN DOING THINGS: 0
10. IF YOU CHECKED OFF ANY PROBLEMS, HOW DIFFICULT HAVE THESE PROBLEMS MADE IT FOR YOU TO DO YOUR WORK, TAKE CARE OF THINGS AT HOME, OR GET ALONG WITH OTHER PEOPLE: 0
4. FEELING TIRED OR HAVING LITTLE ENERGY: 0
7. TROUBLE CONCENTRATING ON THINGS, SUCH AS READING THE NEWSPAPER OR WATCHING TELEVISION: 0
SUM OF ALL RESPONSES TO PHQ QUESTIONS 1-9: 0
3. TROUBLE FALLING OR STAYING ASLEEP: 0
SUM OF ALL RESPONSES TO PHQ QUESTIONS 1-9: 0
SUM OF ALL RESPONSES TO PHQ9 QUESTIONS 1 & 2: 0
5. POOR APPETITE OR OVEREATING: 0
8. MOVING OR SPEAKING SO SLOWLY THAT OTHER PEOPLE COULD HAVE NOTICED. OR THE OPPOSITE, BEING SO FIGETY OR RESTLESS THAT YOU HAVE BEEN MOVING AROUND A LOT MORE THAN USUAL: 0

## 2022-11-08 NOTE — PROGRESS NOTES
Lake Nunez (:  1970) is a 50 y.o. female,Established patient, here for evaluation of the following chief complaint(s): Congestion, Facial Swelling, Sinus Problem, and Other ([phone])      ASSESSMENT/PLAN:  1. Eye swelling, bilateral  -     methylPREDNISolone (MEDROL DOSEPACK) 4 MG tablet; Take by mouth., Disp-1 kit, R-0Normal  Possible allergic type reaction. Do recommend cool compresses to eyes for symptom relief but will begin steroid pack  If symptoms persist or worsen do return to office  Return if symptoms worsen or fail to improve.     SUBJECTIVE/OBJECTIVE:    She was in her usual state of health until 2 to 3 days ago when she noted nasal congestion and postnasal drainage  Symptoms persist however today she had onset of swelling about her eyelids bilaterally  No changes in vision  No new products  No other areas of swelling  No difficulty breathing shortness of breath  [INSTRUCTIONS:  \"[x]\" Indicates a positive item  \"[]\" Indicates a negative item  -- DELETE ALL ITEMS NOT EXAMINED]    Constitutional: [x] Appears well-developed and well-nourished [x] No apparent distress      [] Abnormal -     Mental status: [x] Alert and awake  [x] Oriented to person/place/time [x] Able to follow commands    [] Abnormal -     Eyes:   EOM    [x]  Normal    [] Abnormal -   Sclera  [x]  Normal    [] Abnormal -          Discharge [x]  None visible   [] Abnormal -     HENT: [x] Normocephalic, atraumatic  [] Abnormal -   [x] Mouth/Throat: Mucous membranes are moist    External Ears [x] Normal  [] Abnormal -    Neck: [x] No visualized mass [] Abnormal -     Pulmonary/Chest: [x] Respiratory effort normal   [x] No visualized signs of difficulty breathing or respiratory distress        [] Abnormal -      Musculoskeletal:   [x] Normal gait with no signs of ataxia         [x] Normal range of motion of neck        [] Abnormal -     Neurological:        [x] No Facial Asymmetry (Cranial nerve 7 motor function) (limited exam due to video visit)          [x] No gaze palsy        [] Abnormal -          Skin:        [x] No significant exanthematous lesions or discoloration noted on facial skin         [] Abnormal -            Psychiatric:       [x] Normal Affect [] Abnormal -        [x] No Hallucinations    Other pertinent observable physical exam findings:-    Electronically signed by Vicki Koch MD on 11/8/2022 at 6:12 PM     Please note this chart was generated using dragon dictation software. Although every effort was made to ensure the accuracy of this automated transcription, some errors in transcription may have occurred. Moe Cruz, was evaluated through a synchronous (real-time) audio-video encounter. The patient (or guardian if applicable) is aware that this is a billable service, which includes applicable co-pays. This Virtual Visit was conducted with patient's (and/or legal guardian's) consent. The visit was conducted pursuant to the emergency declaration under the [facility], [address] [place] [address] authority and the [employer] General Act. Patient identification was verified, and a caregiver was present when appropriate. The patient was located at Home: [address] [address]. Provider was located at [facility] [employer] ([address] [place], [place] [address]t): [address] [place] [address],  [address] [place] [address].

## 2022-12-06 DIAGNOSIS — F31.9 BIPOLAR AFFECTIVE DISORDER, REMISSION STATUS UNSPECIFIED (HCC): ICD-10-CM

## 2022-12-06 RX ORDER — QUETIAPINE FUMARATE 100 MG/1
TABLET, FILM COATED ORAL
Qty: 90 TABLET | Refills: 0 | Status: SHIPPED | OUTPATIENT
Start: 2022-12-06

## 2022-12-06 NOTE — TELEPHONE ENCOUNTER
Medication:   Requested Prescriptions     Pending Prescriptions Disp Refills    QUEtiapine (SEROQUEL) 100 MG tablet 90 tablet 0     Sig: TAKE ONE TABLET BY MOUTH AT BEDTIME        Last Filled:  09/29/2022 - 90 tablet     Patient Phone Number: [phone] (home)     Last appt: 11/8/2022   Next appt: none    Last OARRS:   RX Monitoring 6/28/2017   Attestation The Prescription Monitoring Report for this patient was reviewed today. Periodic Controlled Substance Monitoring Possible medication side effects, risk of tolerance and/or dependence, and alternative treatments discussed; No signs of potential drug abuse or diversion identified.

## 2023-01-11 ENCOUNTER — OFFICE VISIT (OUTPATIENT)
Dept: PRIMARY CARE CLINIC | Age: 53
End: 2023-01-11
Payer: MEDICAID

## 2023-01-11 VITALS
BODY MASS INDEX: 28.28 KG/M2 | SYSTOLIC BLOOD PRESSURE: 140 MMHG | WEIGHT: 186 LBS | TEMPERATURE: 98.7 F | HEART RATE: 91 BPM | OXYGEN SATURATION: 99 % | DIASTOLIC BLOOD PRESSURE: 90 MMHG

## 2023-01-11 DIAGNOSIS — H05.223 EDEMA OF BOTH ORBITS: Primary | ICD-10-CM

## 2023-01-11 PROCEDURE — 99213 OFFICE O/P EST LOW 20 MIN: CPT | Performed by: NURSE PRACTITIONER

## 2023-01-11 PROCEDURE — 1036F TOBACCO NON-USER: CPT | Performed by: NURSE PRACTITIONER

## 2023-01-11 PROCEDURE — G8484 FLU IMMUNIZE NO ADMIN: HCPCS | Performed by: NURSE PRACTITIONER

## 2023-01-11 PROCEDURE — G8427 DOCREV CUR MEDS BY ELIG CLIN: HCPCS | Performed by: NURSE PRACTITIONER

## 2023-01-11 PROCEDURE — 3017F COLORECTAL CA SCREEN DOC REV: CPT | Performed by: NURSE PRACTITIONER

## 2023-01-11 PROCEDURE — G8419 CALC BMI OUT NRM PARAM NOF/U: HCPCS | Performed by: NURSE PRACTITIONER

## 2023-01-11 RX ORDER — PREDNISONE 10 MG/1
TABLET ORAL
Qty: 55 TABLET | Refills: 0 | Status: SHIPPED | OUTPATIENT
Start: 2023-01-11

## 2023-01-11 RX ORDER — TRIAMCINOLONE ACETONIDE 40 MG/ML
80 INJECTION, SUSPENSION INTRA-ARTICULAR; INTRAMUSCULAR ONCE
Status: COMPLETED | OUTPATIENT
Start: 2023-01-11 | End: 2023-01-11

## 2023-01-11 RX ADMIN — TRIAMCINOLONE ACETONIDE 80 MG: 40 INJECTION, SUSPENSION INTRA-ARTICULAR; INTRAMUSCULAR at 11:18

## 2023-01-11 NOTE — PROGRESS NOTES
PROGRESS NOTE  Date of Service:  1/11/2023  Address: [place] [address] [facility]  [address] [place] [address] [address] [facility]  Dept: [phone]  Loc: [phone]    Subjective:      Patient ID: [id number]  Kathi Vilchis is a 50 y.o. female    HPI: patient is here with eye swelling and tearing last night. Patient said that her eyes do hurt as well. Patient said that she has been crying, she has taken claratin, benedryl which she does not feel like it is helping. Patient denies anyone else in the house has pink eye. Patient denies itching, she said burning, she said pressure in her eyes and face. Patient denies head congestion, patient said she is getting mucus. Patient said just face ach. Patient said it started slowly, patient did take bactrium she said last night which she feels did not help. Patient said she has blurred vision as well due to the swelling. Patient is taking probiotics. Patient said he did use eye drops     Review of Systems   Constitutional:  Positive for fatigue. Negative for chills and fever. HENT:  Positive for congestion, sinus pressure and sinus pain. Negative for sore throat and trouble swallowing. Eyes:  Positive for pain and discharge. Negative for photophobia, redness, itching and visual disturbance. All other systems reviewed and are negative. Objective:   Physical Exam  Vitals reviewed. Constitutional:       Appearance: Normal appearance. She is well-developed. HENT:      Head: Normocephalic and atraumatic. Right Ear: Tympanic membrane, ear canal and external ear normal.      Left Ear: Tympanic membrane, ear canal and external ear normal.      Nose: Nose normal.      Mouth/Throat:      Pharynx: Uvula midline. No oropharyngeal exudate. Eyes:      Extraocular Movements: Extraocular movements intact. Right eye: Normal extraocular motion. Left eye: Normal extraocular motion.       Conjunctiva/sclera: Conjunctivae normal.      Comments: Bilateral eye swelling    Cardiovascular:      Rate and Rhythm: Normal rate and regular rhythm.      Pulses: Normal pulses.      Heart sounds: Normal heart sounds. No murmur heard.  Pulmonary:      Effort: Pulmonary effort is normal.      Breath sounds: Normal breath sounds.   Skin:     General: Skin is warm and dry.   Neurological:      Mental Status: She is alert and oriented to person, place, and time.   Psychiatric:         Behavior: Behavior normal.         Thought Content: Thought content normal.         Judgment: Judgment normal.       Plan:   1. Edema of both orbits-patient is experiencing orbital edema bilateral.  Recommend patient see an allergist this is been the third time per patient that this happened.  Unable to obtain a picture due to technical difficulties.  Patient will upload 1 to my chart.  Patient will start oral steroids tomorrow.  Patient said the reason she cannot see well is because how swollen her eyes are.  -     predniSONE (DELTASONE) 10 MG tablet; 5 tabs for 5 days, 4 tabs po daily for 4 days, then 3 po daily for 3 days, then 2 po daily for 2 days, then 1 po daily for 1 day, Disp-55 tablet, R-0Normal  -     [facility] - [facility] MD, Allergy & Immunology, [facility]  -     triamcinolone acetonide (KENALOG-40) injection 80 mg; 80 mg, IntraMUSCular, ONCE, 1 dose, On Wed 1/11/23 at 1100           Electronically signed by SALOMÓN Pope CNP on 1/11/23 at 10:18 AM EST     This dictation was generated by voice recognition computer software. Although all attempts are made to edit the dictation for accuracy, there may be errors in the transcription that were not intended.

## 2023-01-11 NOTE — LETTER
[facility]  [address]  [facility] [address]  Phone: [phone]  Fax: [phone]    SALOMÓN Pope CNP        January 11, 2023     Patient: Rosa Hernandez   YOB: 1970   Date of Visit: 1/11/2023       To Whom It May Concern:    It is my medical opinion that Rosa Hernandez may return to work on 1/13/23.     If you have any questions or concerns, please don't hesitate to call.    Sincerely,        SALOMÓN Pope CNP

## 2023-01-12 ASSESSMENT — ENCOUNTER SYMPTOMS
SORE THROAT: 0
EYE PAIN: 1
EYE ITCHING: 0
EYE REDNESS: 0
PHOTOPHOBIA: 0
TROUBLE SWALLOWING: 0
SINUS PRESSURE: 1
EYE DISCHARGE: 1
SINUS PAIN: 1

## 2023-01-25 ENCOUNTER — OFFICE VISIT (OUTPATIENT)
Dept: PRIMARY CARE CLINIC | Age: 53
End: 2023-01-25
Payer: MEDICAID

## 2023-01-25 VITALS
WEIGHT: 181 LBS | DIASTOLIC BLOOD PRESSURE: 70 MMHG | OXYGEN SATURATION: 98 % | TEMPERATURE: 96.6 F | SYSTOLIC BLOOD PRESSURE: 112 MMHG | HEART RATE: 86 BPM | BODY MASS INDEX: 27.52 KG/M2

## 2023-01-25 DIAGNOSIS — M54.42 CHRONIC BILATERAL LOW BACK PAIN WITH LEFT-SIDED SCIATICA: Primary | ICD-10-CM

## 2023-01-25 DIAGNOSIS — G89.29 CHRONIC BILATERAL LOW BACK PAIN WITH LEFT-SIDED SCIATICA: Primary | ICD-10-CM

## 2023-01-25 DIAGNOSIS — F51.04 PSYCHOPHYSIOLOGICAL INSOMNIA: ICD-10-CM

## 2023-01-25 DIAGNOSIS — R79.89 ABNORMAL TSH: ICD-10-CM

## 2023-01-25 DIAGNOSIS — Z87.448 HX OF HEMATURIA: ICD-10-CM

## 2023-01-25 DIAGNOSIS — Z13.220 SCREENING CHOLESTEROL LEVEL: ICD-10-CM

## 2023-01-25 LAB
BACTERIA: ABNORMAL /HPF
EPITHELIAL CELLS, UA: 12 /HPF (ref 0–5)
HYALINE CASTS: 1 /LPF (ref 0–8)
RBC UA: 5 /HPF (ref 0–4)
URINE TYPE: ABNORMAL
WBC UA: 1 /HPF (ref 0–5)

## 2023-01-25 PROCEDURE — 99214 OFFICE O/P EST MOD 30 MIN: CPT | Performed by: NURSE PRACTITIONER

## 2023-01-25 PROCEDURE — G8484 FLU IMMUNIZE NO ADMIN: HCPCS | Performed by: NURSE PRACTITIONER

## 2023-01-25 PROCEDURE — G8427 DOCREV CUR MEDS BY ELIG CLIN: HCPCS | Performed by: NURSE PRACTITIONER

## 2023-01-25 PROCEDURE — G8419 CALC BMI OUT NRM PARAM NOF/U: HCPCS | Performed by: NURSE PRACTITIONER

## 2023-01-25 PROCEDURE — 3017F COLORECTAL CA SCREEN DOC REV: CPT | Performed by: NURSE PRACTITIONER

## 2023-01-25 PROCEDURE — 1036F TOBACCO NON-USER: CPT | Performed by: NURSE PRACTITIONER

## 2023-01-25 RX ORDER — TIZANIDINE 4 MG/1
4 TABLET ORAL 4 TIMES DAILY PRN
Qty: 40 TABLET | Refills: 0 | Status: SHIPPED | OUTPATIENT
Start: 2023-01-25

## 2023-01-25 RX ORDER — LIDOCAINE 50 MG/G
1 PATCH TOPICAL DAILY
Qty: 30 PATCH | Refills: 1 | Status: SHIPPED | OUTPATIENT
Start: 2023-01-25 | End: 2023-02-24

## 2023-01-25 ASSESSMENT — ANXIETY QUESTIONNAIRES
3. WORRYING TOO MUCH ABOUT DIFFERENT THINGS: 1-SEVERAL DAYS
5. BEING SO RESTLESS THAT IT IS HARD TO SIT STILL: 1-SEVERAL DAYS
4. TROUBLE RELAXING: 0-NOT AT ALL
2. NOT BEING ABLE TO STOP OR CONTROL WORRYING: 0-NOT AT ALL
GAD7 TOTAL SCORE: 3
1. FEELING NERVOUS, ANXIOUS, OR ON EDGE: 1
6. BECOMING EASILY ANNOYED OR IRRITABLE: 0-NOT AT ALL
7. FEELING AFRAID AS IF SOMETHING AWFUL MIGHT HAPPEN: 0-NOT AT ALL

## 2023-01-25 ASSESSMENT — PATIENT HEALTH QUESTIONNAIRE - PHQ9
SUM OF ALL RESPONSES TO PHQ QUESTIONS 1-9: 4
5. POOR APPETITE OR OVEREATING: 0
10. IF YOU CHECKED OFF ANY PROBLEMS, HOW DIFFICULT HAVE THESE PROBLEMS MADE IT FOR YOU TO DO YOUR WORK, TAKE CARE OF THINGS AT HOME, OR GET ALONG WITH OTHER PEOPLE: 2
SUM OF ALL RESPONSES TO PHQ9 QUESTIONS 1 & 2: 0
4. FEELING TIRED OR HAVING LITTLE ENERGY: 1
SUM OF ALL RESPONSES TO PHQ QUESTIONS 1-9: 4
3. TROUBLE FALLING OR STAYING ASLEEP: 3
1. LITTLE INTEREST OR PLEASURE IN DOING THINGS: 0
7. TROUBLE CONCENTRATING ON THINGS, SUCH AS READING THE NEWSPAPER OR WATCHING TELEVISION: 0
SUM OF ALL RESPONSES TO PHQ QUESTIONS 1-9: 4
8. MOVING OR SPEAKING SO SLOWLY THAT OTHER PEOPLE COULD HAVE NOTICED. OR THE OPPOSITE, BEING SO FIGETY OR RESTLESS THAT YOU HAVE BEEN MOVING AROUND A LOT MORE THAN USUAL: 0
6. FEELING BAD ABOUT YOURSELF - OR THAT YOU ARE A FAILURE OR HAVE LET YOURSELF OR YOUR FAMILY DOWN: 0
SUM OF ALL RESPONSES TO PHQ QUESTIONS 1-9: 4
2. FEELING DOWN, DEPRESSED OR HOPELESS: 0
9. THOUGHTS THAT YOU WOULD BE BETTER OFF DEAD, OR OF HURTING YOURSELF: 0

## 2023-01-25 ASSESSMENT — ENCOUNTER SYMPTOMS
CHEST TIGHTNESS: 0
BACK PAIN: 1
COUGH: 0
WHEEZING: 0

## 2023-01-25 NOTE — PROGRESS NOTES
PROGRESS NOTE  Date of Service:  1/25/2023  Address: [place] [address] [facility]  [address] [place] [address] [address]  Dept: [phone]  Loc: [phone]    Subjective:      Patient ID: [id number]  Aleksandr Velazquez is a 50 y.o. female    HPI: patient is here to establish care. Back pain- history of back spasms, stabbing pain, continuous pain. Symptoms worsen when she lays down. Flare up started about a week and half ago. Has had back surgery in the past.  Reporting once every six months with these flare ups. Has taken ibuprofen 1000mg every 6 hours, tylenol 300mg once, lidicaine patches 12 hours on lower back and left shine, Tizanidine 4 mg every 4 hours and sometimes would take two tablets at a time. Lidacaine patches are not helping, she takes hot baths, stretches and suction cup therapy she received from past physical therapy. She reports that she has mild spasms with pain every couple months, but that occasionally she have major flare ups like today's pain. Hx of DDD. Great toe numbness and tingling on left. Patient has been to pain clinics but she does not wish to go back. Patient denies blood in urine today. Did go to urgent care one month ago and did have blood in urine and she hydrated and symptoms resolved. Patient states this pain today is different then her kidney infections in the past.   Patient states she drinks 5-6 bottles of water. Requesting refill of her Tizanidine and lidacine patches. Possible better anti-inflammatory. First back injury in 2012 and fell down steps in 2019. PHQ Scores 1/25/2023 11/8/2022 9/13/2022   PHQ2 Score 0 0 1   PHQ9 Score 4 0 6      ELSI 7 SCORE 1/25/2023   ELSI-7 Total Score 3        Review of Systems   Constitutional:  Negative for chills, fatigue and fever. Respiratory:  Negative for cough, chest tightness and wheezing. Cardiovascular:  Negative for chest pain and palpitations.    Genitourinary: Negative for difficulty urinating, flank pain and frequency. Musculoskeletal:  Positive for back pain. Psychiatric/Behavioral:  Positive for sleep disturbance. All other systems reviewed and are negative. Objective:   Physical Exam  Constitutional:       Appearance: Normal appearance. Cardiovascular:      Rate and Rhythm: Normal rate and regular rhythm. Pulses: Normal pulses. Heart sounds: Normal heart sounds. Pulmonary:      Effort: Pulmonary effort is normal.      Breath sounds: Normal breath sounds. Musculoskeletal:         General: Tenderness present. Comments: Lower back, left   Skin:     General: Skin is warm and dry. Capillary Refill: Capillary refill takes less than 2 seconds. Neurological:      General: No focal deficit present. Mental Status: She is alert and oriented to person, place, and time. Psychiatric:         Mood and Affect: Mood normal.         Behavior: Behavior normal.         Thought Content: Thought content normal.         Judgment: Judgment normal.       Plan:   1. Chronic left low back pain with left-sided sciatica. Patient presents with hx of chronic lower left back pain with associated pain that radiates down left leg and to great toe. She has received physical therapy in the past and has been treated at a pain clinic in the past.  Patient wishes to not go to a pain clinic. She reports that tizanidine 4mg has been helping her but she has ran out of her past rx. She also feels that lidocaine patches have been helpful with her mild episodes. Patient instructed to continue preforming exercises and encouraged patient to start doing yoga. Patient educated today that zanaflex will not be a chronic medication, that 1 script should last several months. -     tiZANidine (ZANAFLEX) 4 MG tablet; Take 1 tablet by mouth 4 times daily as needed (muscle spasms), Disp-40 tablet, R-0Normal  -     diclofenac (VOLTAREN) 50 MG EC tablet;  Take 1 tablet by mouth 2 times daily, Disp-60 tablet, R-1Normal  -     lidocaine (LIDODERM) 5 %; Place 1 patch onto the skin daily 12 hours on, 12 hours off., Disp-30 patch, R-1Normal  2. Screening cholesterol level. Routine preventative screening.   -     Lipid Panel; Future  3. Abnormal TSH. Patient had abnormal TSH in past.  Will check today.   -     TSH with Reflex; Future  4. Hx of hematuria. Patient states she went to an urgent care about a month ago for dark colored urine. She was told her had blood in her urine but no signs of infection. Patient states she occasionally notices her urine is tea colored, but then she hydrates and urine color improves. -     MICROSCOPIC URINALYSIS  5. Psychophysiological insomnia. Patient is questioning if she menopausal symptoms, specifically trouble sleeping, bloating, and constipation. She had a partial hysterectomy in the past.  Will check [address] to see if patient's symptoms are menopausal related. If [address] is elevated will refer to gynecology. Talked to patient today about effexor she did not tolerate in the past.    -     Follicle Stimulating Hormone; Future           Electronically signed by SALOMÓN Pollock CNP on 1/25/23 at 11:34 AM EST     This dictation was generated by voice recognition computer software. Although all attempts are made to edit the dictation for accuracy, there may be errors in the transcription that were not intended.

## 2023-01-27 DIAGNOSIS — R31.21 ASYMPTOMATIC MICROSCOPIC HEMATURIA: Primary | ICD-10-CM

## 2023-02-03 DIAGNOSIS — F31.9 BIPOLAR AFFECTIVE DISORDER, REMISSION STATUS UNSPECIFIED (HCC): ICD-10-CM

## 2023-02-03 RX ORDER — QUETIAPINE FUMARATE 100 MG/1
TABLET, FILM COATED ORAL
Qty: 90 TABLET | Refills: 0 | Status: SHIPPED | OUTPATIENT
Start: 2023-02-03

## 2023-04-25 ENCOUNTER — TELEPHONE (OUTPATIENT)
Dept: PRIMARY CARE CLINIC | Age: 53
End: 2023-04-25

## 2023-04-25 DIAGNOSIS — F31.9 BIPOLAR AFFECTIVE DISORDER, REMISSION STATUS UNSPECIFIED (HCC): ICD-10-CM

## 2023-04-25 NOTE — TELEPHONE ENCOUNTER
Care Transitions Initial Follow Up Call    Outreach made within 2 business days of discharge: Yes    Patient: Yesi Lewis Patient : 1970   MRN: [id number]  Reason for Admission: There are no discharge diagnoses documented for the most recent discharge. Discharge Date: 16       Spoke with: patient    Discharge department/facility: [facility]    TCM Interactive Patient Contact:  Was patient able to fill all prescriptions: Yes  Was patient instructed to bring all medications to the follow-up visit: Yes  Is patient taking all medications as directed in the discharge summary?  Yes  Does patient understand their discharge instructions: Yes  Does patient have questions or concerns that need addressed prior to 7-14 day follow up office visit: no    Scheduled appointment with PCP within 7-14 days    Follow Up  Future Appointments   Date Time Provider Vielka Howell   5/3/2023 11:00 AM SALOMÓN Doty - CNP [facility] [place], [place]

## 2023-04-25 NOTE — TELEPHONE ENCOUNTER
Medication:   Requested Prescriptions     Pending Prescriptions Disp Refills    QUEtiapine (SEROQUEL) 100 MG tablet [Pharmacy Med Name: QUEtiapine FUMARATE 100 MG TAB] 90 tablet 0     Sig: TAKE ONE TABLET BY MOUTH AT BEDTIME        Last Filled:  [id number] lm for patient to make appointment. Patient Phone Number: [phone] (home)     Last appt: 1/25/2023   Next appt: Return if symptoms worsen or fail to improve. Last OARRS:   RX Monitoring 6/28/2017   Attestation The Prescription Monitoring Report for this patient was reviewed today. Periodic Controlled Substance Monitoring Possible medication side effects, risk of tolerance and/or dependence, and alternative treatments discussed; No signs of potential drug abuse or diversion identified.

## 2023-04-26 RX ORDER — QUETIAPINE FUMARATE 100 MG/1
TABLET, FILM COATED ORAL
Qty: 90 TABLET | Refills: 0 | Status: SHIPPED | OUTPATIENT
Start: 2023-04-26

## 2023-04-28 ENCOUNTER — OFFICE VISIT (OUTPATIENT)
Dept: PRIMARY CARE CLINIC | Age: 53
End: 2023-04-28
Payer: MEDICAID

## 2023-04-28 ENCOUNTER — TELEPHONE (OUTPATIENT)
Dept: PRIMARY CARE CLINIC | Age: 53
End: 2023-04-28

## 2023-04-28 VITALS
BODY MASS INDEX: 28.62 KG/M2 | WEIGHT: 188.2 LBS | DIASTOLIC BLOOD PRESSURE: 84 MMHG | TEMPERATURE: 97.7 F | HEART RATE: 78 BPM | SYSTOLIC BLOOD PRESSURE: 146 MMHG | OXYGEN SATURATION: 98 %

## 2023-04-28 DIAGNOSIS — F31.9 BIPOLAR AFFECTIVE DISORDER, REMISSION STATUS UNSPECIFIED (HCC): ICD-10-CM

## 2023-04-28 DIAGNOSIS — M79.602 LEFT ARM PAIN: Primary | ICD-10-CM
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
## 2023-04-28 PROCEDURE — 99214 OFFICE O/P EST MOD 30 MIN: CPT | Performed by: NURSE PRACTITIONER

## 2023-04-28 PROCEDURE — 1036F TOBACCO NON-USER: CPT | Performed by: NURSE PRACTITIONER

## 2023-04-28 PROCEDURE — 3017F COLORECTAL CA SCREEN DOC REV: CPT | Performed by: NURSE PRACTITIONER

## 2023-04-28 PROCEDURE — G8427 DOCREV CUR MEDS BY ELIG CLIN: HCPCS | Performed by: NURSE PRACTITIONER

## 2023-04-28 PROCEDURE — G8419 CALC BMI OUT NRM PARAM NOF/U: HCPCS | Performed by: NURSE PRACTITIONER

## 2023-04-28 RX ORDER — OXYCODONE HYDROCHLORIDE 10 MG/1
TABLET ORAL
COMMUNITY
Start: 2023-04-25

## 2023-04-28 RX ORDER — DULOXETIN HYDROCHLORIDE 30 MG/1
30 CAPSULE, DELAYED RELEASE ORAL DAILY
Qty: 90 CAPSULE | Refills: 1 | Status: SHIPPED | OUTPATIENT
Start: 2023-04-28

## 2023-04-28 RX ORDER — FLUCONAZOLE 150 MG/1
150 TABLET ORAL ONCE
Qty: 1 TABLET | Refills: 0 | Status: SHIPPED | OUTPATIENT
Start: 2023-04-28 | End: 2023-04-28

## 2023-04-28 RX ORDER — HYDROCODONE BITARTRATE AND ACETAMINOPHEN 7.5; 325 MG/1; MG/1
1 TABLET ORAL EVERY 6 HOURS PRN
Qty: 12 TABLET | Refills: 0 | Status: SHIPPED | OUTPATIENT
Start: 2023-04-28 | End: 2023-05-01

## 2023-07-03 DIAGNOSIS — F31.9 BIPOLAR AFFECTIVE DISORDER, REMISSION STATUS UNSPECIFIED (HCC): ICD-10-CM

## 2023-07-03 RX ORDER — QUETIAPINE FUMARATE 100 MG/1
100 TABLET, FILM COATED ORAL NIGHTLY
Qty: 90 TABLET | Refills: 0 | Status: SHIPPED | OUTPATIENT
Start: 2023-07-03

## 2023-07-03 NOTE — TELEPHONE ENCOUNTER
Medication:   Requested Prescriptions     Pending Prescriptions Disp Refills    QUEtiapine (SEROQUEL) 100 MG tablet 90 tablet 0     Sig: Take 1 tablet by mouth nightly        Last Filled:  04/26/2023 # 90    Patient Phone Number: [phone] (home)     Last appt: 4/28/2023 Instructions      Return in about 7 weeks (around 6/16/2023) for mood. AVS (Automatic SnapShot taken 5/1/2023)  Next appt: Visit date not found    Last OARRS:   RX Monitoring 6/28/2017   Attestation The Prescription Monitoring Report for this patient was reviewed today. Periodic Controlled Substance Monitoring Possible medication side effects, risk of tolerance and/or dependence, and alternative treatments discussed; No signs of potential drug abuse or diversion identified.

## 2023-07-03 NOTE — TELEPHONE ENCOUNTER
Patient calling into office requesting a refill of QUEtiapine (SEROQUEL) 100 MG tablet to [facility].     Last appt: 4/28/23  Next appt: None

## 2023-07-27 ENCOUNTER — TELEPHONE (OUTPATIENT)
Dept: PRIMARY CARE CLINIC | Age: 53
End: 2023-07-27

## 2023-07-27 DIAGNOSIS — G89.29 CHRONIC BILATERAL LOW BACK PAIN WITH LEFT-SIDED SCIATICA: ICD-10-CM

## 2023-07-27 DIAGNOSIS — M54.42 CHRONIC BILATERAL LOW BACK PAIN WITH LEFT-SIDED SCIATICA: ICD-10-CM

## 2023-07-27 RX ORDER — TIZANIDINE 4 MG/1
4 TABLET ORAL 4 TIMES DAILY PRN
Qty: 40 TABLET | Refills: 0 | OUTPATIENT
Start: 2023-07-27

## 2023-07-27 RX ORDER — LIDOCAINE 50 MG/G
1 PATCH TOPICAL DAILY
Qty: 30 PATCH | Refills: 1 | Status: SHIPPED | OUTPATIENT
Start: 2023-07-27 | End: 2023-09-18

## 2023-07-27 RX ORDER — LIDOCAINE 50 MG/G
1 PATCH TOPICAL DAILY
Qty: 30 PATCH | Refills: 1 | Status: SHIPPED | OUTPATIENT
Start: 2023-07-27 | End: 2023-09-25

## 2023-07-27 NOTE — TELEPHONE ENCOUNTER
Prior Auth / Lidocaine     KEY: [id number]    Prior Auth received from pharmacy, scanned to media, routing to clinical staff for review prior to sending to pa dept.

## 2023-07-27 NOTE — TELEPHONE ENCOUNTER
Pt calling also requesting lidocaine patches and an anti-inflammatory. Pt is willing to do a vv as she is unable to drive at this time. Pt requesting a call back.

## 2023-07-27 NOTE — TELEPHONE ENCOUNTER
Medication:   Requested Prescriptions     Pending Prescriptions Disp Refills    tiZANidine (ZANAFLEX) 4 MG tablet 40 tablet 0     Sig: Take 1 tablet by mouth 4 times daily as needed (muscle spasms)        Last Filled:  [id number]    Patient Phone Number: [phone] (home)     Last appt: 4/28/2023   Next appt: Visit date not found    Last OARRS:   RX Monitoring 6/28/2017   Attestation The Prescription Monitoring Report for this patient was reviewed today. Periodic Controlled Substance Monitoring Possible medication side effects, risk of tolerance and/or dependence, and alternative treatments discussed; No signs of potential drug abuse or diversion identified.

## 2023-07-27 NOTE — TELEPHONE ENCOUNTER
Patient calling into office reporting shw is having a flare up on her sciatica. She isn't able to bend over. Patient requesting a refill of tiZANidine (ZANAFLEX) 4 MG tablet to [facility] in [place]. Patient states she is willing to do a virtual if needed.      Last appt: 4/28/23  Next appt: None

## 2023-07-27 NOTE — TELEPHONE ENCOUNTER
Will send in lidocaine recommend in person appointment for the muscle relaxer, I can also refer her to [facility] as well there ortho spine. She can get ibuprofen over the counter as well.

## 2023-07-28 NOTE — TELEPHONE ENCOUNTER
Submitted PA for Lidocaine 5% patches  [facility]ia [facility] Key: [id number] STATUS: PENDING. Follow up done daily; if no response in three days we will refax for status check. If another three days goes by with no response we will call the insurance for status.

## 2023-07-28 NOTE — TELEPHONE ENCOUNTER
lidocaine (LIDODERM) 5 %      Diagnosis Association: Chronic left low back pain with left-sided sciatica     Routing to PA team for processing

## 2023-07-30 ENCOUNTER — PATIENT MESSAGE (OUTPATIENT)
Dept: PRIMARY CARE CLINIC | Age: 53
End: 2023-07-30

## 2023-07-31 NOTE — TELEPHONE ENCOUNTER
From: Mariana Hearing  To: Lynn He  Sent: 7/30/2023 3:22 PM EDT  Subject: Back    Can you set me up with a back doctor for my back because I have these flare ups and i know a back doctor could help.  Thank you

## 2023-08-01 DIAGNOSIS — G89.29 CHRONIC BILATERAL LOW BACK PAIN WITH LEFT-SIDED SCIATICA: Primary | ICD-10-CM

## 2023-08-01 DIAGNOSIS — M54.42 CHRONIC BILATERAL LOW BACK PAIN WITH LEFT-SIDED SCIATICA: Primary | ICD-10-CM

## 2023-09-01 DIAGNOSIS — F31.9 BIPOLAR AFFECTIVE DISORDER, REMISSION STATUS UNSPECIFIED (HCC): ICD-10-CM

## 2023-09-01 RX ORDER — QUETIAPINE FUMARATE 100 MG/1
100 TABLET, FILM COATED ORAL NIGHTLY
Qty: 90 TABLET | Refills: 0 | OUTPATIENT
Start: 2023-09-01

## 2023-09-07 RX ORDER — QUETIAPINE FUMARATE 100 MG/1
100 TABLET, FILM COATED ORAL NIGHTLY
Qty: 20 TABLET | Refills: 0 | Status: SHIPPED | OUTPATIENT
Start: 2023-09-07

## 2023-09-07 NOTE — TELEPHONE ENCOUNTER
Pt states that medication fell in the toilet when dog jumped on her and had to refill it. Verbal/written post procedure instructions were given to patient/caregiver.

## 2023-09-12 RX ORDER — METHOCARBAMOL 500 MG/1
TABLET, FILM COATED ORAL
COMMUNITY
Start: 2023-07-28 | End: 2023-09-18

## 2023-09-18 ENCOUNTER — OFFICE VISIT (OUTPATIENT)
Dept: PRIMARY CARE CLINIC | Age: 53
End: 2023-09-18
Payer: MEDICAID

## 2023-09-18 VITALS
HEIGHT: 68 IN | HEART RATE: 88 BPM | OXYGEN SATURATION: 99 % | DIASTOLIC BLOOD PRESSURE: 80 MMHG | BODY MASS INDEX: 26.01 KG/M2 | TEMPERATURE: 97.8 F | SYSTOLIC BLOOD PRESSURE: 130 MMHG | WEIGHT: 171.6 LBS

## 2023-09-18 DIAGNOSIS — Z00.00 ENCOUNTER FOR PREVENTIVE CARE: Primary | ICD-10-CM

## 2023-09-18 DIAGNOSIS — Z12.39 SCREENING BREAST EXAMINATION: ICD-10-CM

## 2023-09-18 DIAGNOSIS — F31.9 BIPOLAR AFFECTIVE DISORDER, REMISSION STATUS UNSPECIFIED (HCC): ICD-10-CM

## 2023-09-18 PROCEDURE — 99396 PREV VISIT EST AGE 40-64: CPT | Performed by: NURSE PRACTITIONER

## 2023-09-18 RX ORDER — QUETIAPINE FUMARATE 150 MG/1
150 TABLET, FILM COATED ORAL NIGHTLY
Qty: 90 TABLET | Refills: 1 | Status: SHIPPED | OUTPATIENT
Start: 2023-09-18

## 2023-09-18 ASSESSMENT — ENCOUNTER SYMPTOMS
COUGH: 0
WHEEZING: 0
SHORTNESS OF BREATH: 0
BACK PAIN: 1

## 2023-09-18 NOTE — PROGRESS NOTES
PROGRESS NOTE  Date of Service:  9/18/2023  Address: [address] [place] [address]  Dept: [phone]  Loc: [phone]    Subjective:      Patient ID: [id number]  Stewart Monreal is a 48 y.o. female    HPI: patient is here for physical.     Patient said that she is not sleeping through the night, she said that she used to sleep through the night. She said it is affected her work as well. She said that she will go to bed at 10 pm and wake up at 2 am. Patient said she notices she has lows, she said she wants to lay around the house. She said in the past she has been on adderall. She said she can not get started she gets overwhelmed. Paxil and Effexor which she has ever been on, patient does not want to be on welbutrin. Patient said she was impulsive. Patient said the Seroquel helps her process her. Patient does have back pain she said that she has taken muscle relaxer in the past and using lidocaine patches. Review of Systems   Constitutional:  Negative for chills, fatigue and fever. Respiratory:  Negative for cough, shortness of breath and wheezing. Musculoskeletal:  Positive for back pain. All other systems reviewed and are negative. Objective:   Physical Exam  Vitals reviewed. Constitutional:       Appearance: Normal appearance. She is well-developed. HENT:      Head: Normocephalic and atraumatic. Right Ear: Tympanic membrane, ear canal and external ear normal.      Left Ear: Tympanic membrane, ear canal and external ear normal.      Nose: Nose normal.      Mouth/Throat:      Pharynx: Uvula midline. No oropharyngeal exudate. Cardiovascular:      Rate and Rhythm: Normal rate and regular rhythm. Pulses: Normal pulses. Heart sounds: Normal heart sounds. No murmur heard. Pulmonary:      Effort: Pulmonary effort is normal.      Breath sounds: Normal breath sounds.    Skin:     General: Skin

## 2023-09-21 DIAGNOSIS — H10.13 ALLERGIC CONJUNCTIVITIS OF BOTH EYES: Primary | ICD-10-CM

## 2023-09-21 RX ORDER — KETOTIFEN FUMARATE 0.35 MG/ML
1 SOLUTION/ DROPS OPHTHALMIC 2 TIMES DAILY
Qty: 1 ML | Refills: 0 | Status: SHIPPED | OUTPATIENT
Start: 2023-09-21 | End: 2023-10-01

## 2023-09-25 ENCOUNTER — TELEPHONE (OUTPATIENT)
Dept: PRIMARY CARE CLINIC | Age: 53
End: 2023-09-25

## 2023-09-25 NOTE — TELEPHONE ENCOUNTER
Pt seen results on my chart and would like to know what the provider would like her to do since some of the results were high. Pt would like to know if she needs to go back on medication.

## 2023-09-26 ENCOUNTER — TELEPHONE (OUTPATIENT)
Dept: PRIMARY CARE CLINIC | Age: 53
End: 2023-09-26

## 2023-09-26 DIAGNOSIS — H10.13 ALLERGIC CONJUNCTIVITIS OF BOTH EYES: ICD-10-CM

## 2023-09-26 RX ORDER — KETOTIFEN FUMARATE 0.25 MG/ML
1 SOLUTION/ DROPS OPHTHALMIC 2 TIMES DAILY
Qty: 5 ML | Refills: 0 | Status: SHIPPED | OUTPATIENT
Start: 2023-09-26

## 2023-09-26 RX ORDER — KETOTIFEN FUMARATE 0.35 MG/ML
1 SOLUTION/ DROPS OPHTHALMIC 2 TIMES DAILY
Qty: 1 ML | Refills: 0 | Status: SHIPPED | OUTPATIENT
Start: 2023-09-26 | End: 2023-09-26 | Stop reason: SDUPTHER

## 2023-09-26 NOTE — TELEPHONE ENCOUNTER
Patient calling in regards to eye drops. Chart shows that ketotifen (ZADITOR) 0.025 % ophthalmic solution was ordered to [facility] in [place], however E-prescribing status states transmission to pharmacy failed. Pharmacy does not have her medication.

## 2023-09-27 ENCOUNTER — TELEPHONE (OUTPATIENT)
Dept: PRIMARY CARE CLINIC | Age: 53
End: 2023-09-27

## 2023-09-27 NOTE — TELEPHONE ENCOUNTER
Called pt to let her know her \"physician results form\" was ready for p/u at the .  LM for pt to call back

## 2023-10-23 RX ORDER — SUMATRIPTAN 25 MG/1
TABLET, FILM COATED ORAL
COMMUNITY

## 2023-11-06 ENCOUNTER — TELEPHONE (OUTPATIENT)
Dept: PRIMARY CARE CLINIC | Age: 53
End: 2023-11-06

## 2023-11-06 NOTE — TELEPHONE ENCOUNTER
Pt would like for Provider to call her about her health. Pt refused to disclose any information about what needs to be discussed. Pt states that it is very personal.      Pt declined an appt.

## 2023-11-07 ENCOUNTER — TELEPHONE (OUTPATIENT)
Dept: PRIMARY CARE CLINIC | Age: 53
End: 2023-11-07

## 2023-11-07 NOTE — TELEPHONE ENCOUNTER
Spoke with patient yesterday. She will go to [facility] in the morning.

## 2023-11-07 NOTE — TELEPHONE ENCOUNTER
Pt would like to inform the provider that she has an upcoming apt at [facility] on Thursday 11/9/23 @ 9:00. Pt listed provider on her Communication form so she is able to f/u on her care.

## 2023-11-13 ENCOUNTER — OFFICE VISIT (OUTPATIENT)
Dept: PRIMARY CARE CLINIC | Age: 53
End: 2023-11-13
Payer: MEDICAID

## 2023-11-13 ENCOUNTER — TELEPHONE (OUTPATIENT)
Dept: PRIMARY CARE CLINIC | Age: 53
End: 2023-11-13

## 2023-11-13 VITALS
OXYGEN SATURATION: 97 % | WEIGHT: 179.2 LBS | HEART RATE: 62 BPM | BODY MASS INDEX: 28.12 KG/M2 | SYSTOLIC BLOOD PRESSURE: 104 MMHG | DIASTOLIC BLOOD PRESSURE: 60 MMHG | HEIGHT: 67 IN | TEMPERATURE: 98.2 F | RESPIRATION RATE: 16 BRPM

## 2023-11-13 DIAGNOSIS — G43.009 MIGRAINE WITHOUT AURA AND RESPONSIVE TO TREATMENT: ICD-10-CM

## 2023-11-13 DIAGNOSIS — F31.9 BIPOLAR AFFECTIVE DISORDER, REMISSION STATUS UNSPECIFIED (HCC): Primary | ICD-10-CM

## 2023-11-13 PROCEDURE — 99214 OFFICE O/P EST MOD 30 MIN: CPT | Performed by: NURSE PRACTITIONER

## 2023-11-13 PROCEDURE — 1036F TOBACCO NON-USER: CPT | Performed by: NURSE PRACTITIONER

## 2023-11-13 PROCEDURE — G8427 DOCREV CUR MEDS BY ELIG CLIN: HCPCS | Performed by: NURSE PRACTITIONER

## 2023-11-13 PROCEDURE — G8484 FLU IMMUNIZE NO ADMIN: HCPCS | Performed by: NURSE PRACTITIONER

## 2023-11-13 PROCEDURE — G8419 CALC BMI OUT NRM PARAM NOF/U: HCPCS | Performed by: NURSE PRACTITIONER

## 2023-11-13 PROCEDURE — 3017F COLORECTAL CA SCREEN DOC REV: CPT | Performed by: NURSE PRACTITIONER

## 2023-11-13 RX ORDER — BUPROPION HYDROCHLORIDE 150 MG/1
150 TABLET ORAL EVERY MORNING
Qty: 90 TABLET | Refills: 1 | Status: SHIPPED | OUTPATIENT
Start: 2023-11-13

## 2023-11-13 SDOH — ECONOMIC STABILITY: INCOME INSECURITY: HOW HARD IS IT FOR YOU TO PAY FOR THE VERY BASICS LIKE FOOD, HOUSING, MEDICAL CARE, AND HEATING?: NOT HARD AT ALL

## 2023-11-13 SDOH — ECONOMIC STABILITY: FOOD INSECURITY: WITHIN THE PAST 12 MONTHS, YOU WORRIED THAT YOUR FOOD WOULD RUN OUT BEFORE YOU GOT MONEY TO BUY MORE.: NEVER TRUE

## 2023-11-13 SDOH — ECONOMIC STABILITY: FOOD INSECURITY: WITHIN THE PAST 12 MONTHS, THE FOOD YOU BOUGHT JUST DIDN'T LAST AND YOU DIDN'T HAVE MONEY TO GET MORE.: NEVER TRUE

## 2023-11-13 SDOH — ECONOMIC STABILITY: HOUSING INSECURITY
IN THE LAST 12 MONTHS, WAS THERE A TIME WHEN YOU DID NOT HAVE A STEADY PLACE TO SLEEP OR SLEPT IN A SHELTER (INCLUDING NOW)?: NO

## 2023-11-13 ASSESSMENT — ANXIETY QUESTIONNAIRES
2. NOT BEING ABLE TO STOP OR CONTROL WORRYING: 0-NOT AT ALL
4. TROUBLE RELAXING: 1-SEVERAL DAYS
1. FEELING NERVOUS, ANXIOUS, OR ON EDGE: 0
7. FEELING AFRAID AS IF SOMETHING AWFUL MIGHT HAPPEN: 0-NOT AT ALL
5. BEING SO RESTLESS THAT IT IS HARD TO SIT STILL: 0-NOT AT ALL
3. WORRYING TOO MUCH ABOUT DIFFERENT THINGS: 1-SEVERAL DAYS
GAD7 TOTAL SCORE: 2
6. BECOMING EASILY ANNOYED OR IRRITABLE: 0-NOT AT ALL

## 2023-11-13 ASSESSMENT — PATIENT HEALTH QUESTIONNAIRE - PHQ9
5. POOR APPETITE OR OVEREATING: 0
SUM OF ALL RESPONSES TO PHQ9 QUESTIONS 1 & 2: 1
6. FEELING BAD ABOUT YOURSELF - OR THAT YOU ARE A FAILURE OR HAVE LET YOURSELF OR YOUR FAMILY DOWN: 0
SUM OF ALL RESPONSES TO PHQ QUESTIONS 1-9: 8
10. IF YOU CHECKED OFF ANY PROBLEMS, HOW DIFFICULT HAVE THESE PROBLEMS MADE IT FOR YOU TO DO YOUR WORK, TAKE CARE OF THINGS AT HOME, OR GET ALONG WITH OTHER PEOPLE: 1
4. FEELING TIRED OR HAVING LITTLE ENERGY: 1
7. TROUBLE CONCENTRATING ON THINGS, SUCH AS READING THE NEWSPAPER OR WATCHING TELEVISION: 3
2. FEELING DOWN, DEPRESSED OR HOPELESS: 1
9. THOUGHTS THAT YOU WOULD BE BETTER OFF DEAD, OR OF HURTING YOURSELF: 0
SUM OF ALL RESPONSES TO PHQ QUESTIONS 1-9: 8
3. TROUBLE FALLING OR STAYING ASLEEP: 3
SUM OF ALL RESPONSES TO PHQ QUESTIONS 1-9: 8
SUM OF ALL RESPONSES TO PHQ QUESTIONS 1-9: 8
1. LITTLE INTEREST OR PLEASURE IN DOING THINGS: 0
8. MOVING OR SPEAKING SO SLOWLY THAT OTHER PEOPLE COULD HAVE NOTICED. OR THE OPPOSITE, BEING SO FIGETY OR RESTLESS THAT YOU HAVE BEEN MOVING AROUND A LOT MORE THAN USUAL: 0

## 2023-11-13 ASSESSMENT — ENCOUNTER SYMPTOMS
SHORTNESS OF BREATH: 0
COUGH: 0
ABDOMINAL DISTENTION: 0
WHEEZING: 0

## 2023-11-13 ASSESSMENT — COLUMBIA-SUICIDE SEVERITY RATING SCALE - C-SSRS
7. DID THIS OCCUR IN THE LAST THREE MONTHS: NO
3. HAVE YOU BEEN THINKING ABOUT HOW YOU MIGHT KILL YOURSELF?: NO
4. HAVE YOU HAD THESE THOUGHTS AND HAD SOME INTENTION OF ACTING ON THEM?: NO
5. HAVE YOU STARTED TO WORK OUT OR WORKED OUT THE DETAILS OF HOW TO KILL YOURSELF? DO YOU INTEND TO CARRY OUT THIS PLAN?: NO

## 2023-11-13 NOTE — PROGRESS NOTES
PROGRESS NOTE  Date of Service:  11/13/2023  Address: [address] [place] [address] CARE  [address] [place] [address]  Dept: [phone]  Loc: [phone]    Subjective:      Patient ID: [id number]  Jayant Ward is a 48 y.o. female    HPI: patient is here for follow up on her mental health. Patient was started on soboxen. Patient said that she is tried. She is feeling much better. He did talk to her  about her medication and she is feeling much better. She is frustrated with her adhd, she said she is not able to keep up. She has been on medication in the past and would like to talk to her. Patient has noticed compression         11/13/2023     3:11 PM 1/25/2023    11:52 AM 11/8/2022    12:13 PM   PHQ Scores   PHQ2 Score 1 0 0   PHQ9 Score 8 4 0          11/13/2023     3:00 PM 1/25/2023    11:00 AM   ELSI 7 SCORE   ELSI-7 Total Score 2 3        Review of Systems   Constitutional:  Negative for chills and fatigue. Respiratory:  Negative for cough, shortness of breath and wheezing. Cardiovascular:  Negative for chest pain, palpitations and leg swelling. Gastrointestinal:  Negative for abdominal distention. Psychiatric/Behavioral:  Negative for self-injury, sleep disturbance and suicidal ideas. The patient is not nervous/anxious. All other systems reviewed and are negative. Objective:   Physical Exam  Vitals reviewed. Constitutional:       Appearance: Normal appearance. Cardiovascular:      Rate and Rhythm: Normal rate and regular rhythm. Pulses: Normal pulses. Heart sounds: Normal heart sounds. Pulmonary:      Effort: Pulmonary effort is normal.      Breath sounds: Normal breath sounds. Skin:     Capillary Refill: Capillary refill takes less than 2 seconds. Neurological:      General: No focal deficit present. Mental Status: She is alert and oriented to person, place, and time.    Psychiatric:         Mood and

## 2023-11-13 NOTE — TELEPHONE ENCOUNTER
ALBERTO / Gene Sight     ALBERTO Received from patient. Scanned to Media Mgr, attached to encounter, and faxed to facility. No further action required.

## 2024-03-14 RX ORDER — TRAZODONE HYDROCHLORIDE 100 MG/1
TABLET ORAL
COMMUNITY
Start: 2024-01-03

## 2024-03-21 ENCOUNTER — OFFICE VISIT (OUTPATIENT)
Dept: ORTHOPEDIC SURGERY | Age: 54
End: 2024-03-21
Payer: COMMERCIAL

## 2024-03-21 VITALS — WEIGHT: 189 LBS | BODY MASS INDEX: 29.66 KG/M2 | HEIGHT: 67 IN

## 2024-03-21 DIAGNOSIS — M79.601 RIGHT ARM PAIN: Primary | ICD-10-CM

## 2024-03-21 DIAGNOSIS — F31.9 BIPOLAR AFFECTIVE DISORDER, REMISSION STATUS UNSPECIFIED (HCC): ICD-10-CM

## 2024-03-21 DIAGNOSIS — M54.12 CERVICAL RADICULITIS: ICD-10-CM

## 2024-03-21 DIAGNOSIS — M75.81 ROTATOR CUFF TENDONITIS, RIGHT: ICD-10-CM

## 2024-03-21 PROCEDURE — 3017F COLORECTAL CA SCREEN DOC REV: CPT | Performed by: PHYSICIAN ASSISTANT

## 2024-03-21 PROCEDURE — G8427 DOCREV CUR MEDS BY ELIG CLIN: HCPCS | Performed by: PHYSICIAN ASSISTANT

## 2024-03-21 PROCEDURE — G8484 FLU IMMUNIZE NO ADMIN: HCPCS | Performed by: PHYSICIAN ASSISTANT

## 2024-03-21 PROCEDURE — 99203 OFFICE O/P NEW LOW 30 MIN: CPT | Performed by: PHYSICIAN ASSISTANT

## 2024-03-21 PROCEDURE — 1036F TOBACCO NON-USER: CPT | Performed by: PHYSICIAN ASSISTANT

## 2024-03-21 PROCEDURE — G8419 CALC BMI OUT NRM PARAM NOF/U: HCPCS | Performed by: PHYSICIAN ASSISTANT

## 2024-03-21 RX ORDER — PREDNISONE 20 MG/1
20 TABLET ORAL DAILY
Qty: 10 TABLET | Refills: 0 | Status: SHIPPED | OUTPATIENT
Start: 2024-03-21 | End: 2024-03-31

## 2024-03-21 RX ORDER — TIZANIDINE 4 MG/1
4 TABLET ORAL 4 TIMES DAILY PRN
Qty: 60 TABLET | Refills: 0 | Status: SHIPPED | OUTPATIENT
Start: 2024-03-21

## 2024-03-21 NOTE — PROGRESS NOTES
New Patient: CERVICAL SPINE    Referring Provider:  No ref. provider found    CHIEF COMPLAINT:    Chief Complaint   Patient presents with    Arm Pain     RT Arm Pain       HISTORY OF PRESENT ILLNESS:   Ms. Rosa Hernandez is a pleasant 53 y.o. old female here in the [place] after [facility] clinic for consultation regarding her neck and right arm pain. She states the pain began suddenly about 3 to 4 weeks ago.  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 Her pain has steadily worsened since then. She rates her neck pain as a tightness and right shoulder and arm pain 10/10 VAS. She describes the pain as constant ache, burning, numbness and tingling.  Pain is worst with activity and improved some with rest . The arm pain radiates to her right hand. She reports numbness and tingling in the right hand and all fingers. She reports weakness of her right upper extremity.  Patient reports difficulty with fine motor skills. She denies lower extremity symptoms, gait abnormality, saddle numbness and bowel or bladder dysfunction. The pain does interfere with her sleep.  She has a history of ACDF C5-C6 for cervical vertebral fracture after a fall down the steps.  She states this was performed in [place] several years ago.  She also has a history of acute CVA 2021.  She also has a history of blood clots and PE.    Current/Past Treatment:   Physical Therapy: no  Chiropractic:  no  Injection:  no   Medications: She has tried medications including Voltaren gel CBD THC lotion, lidocaine patches.  Diclofenac tends to upset her stomach.  She has also tried Tylenol.  She is on Suboxone for a previous narcotic habit after a severe left arm dog bite.    Past Medical History:   Past Medical History:   Diagnosis Date    Acute CVA (cerebrovascular accident) ([facility]) 08/16/2021    Last Assessment & Plan:  Formatting of this note might be different from the

## 2024-03-22 ENCOUNTER — TELEPHONE (OUTPATIENT)
Dept: PRIMARY CARE CLINIC | Age: 54
End: 2024-03-22

## 2024-03-22 RX ORDER — QUETIAPINE 150 MG/1
150 TABLET, FILM COATED, EXTENDED RELEASE ORAL NIGHTLY
Qty: 30 TABLET | OUTPATIENT
Start: 2024-03-22

## 2024-03-22 NOTE — TELEPHONE ENCOUNTER
Medication:   Requested Prescriptions     Pending Prescriptions Disp Refills    QUEtiapine (SEROQUEL XR) 150 MG TB24 extended release tablet [Pharmacy Med Name: QUEtiapine  MG TABLET] 30 tablet      Sig: TAKE ONE TABLET BY MOUTH EVERY NIGHT AT BEDTIME        Last Filled: [id number]     Patient Phone Number: [phone] (home)     Last appt: 11/13/2023   Next appt: Visit date not found    Last OARRS:       6/28/2017     2:53 PM   RX Monitoring   Attestation The Prescription Monitoring Report for this patient was reviewed today.   Periodic Controlled Substance Monitoring Possible medication side effects, risk of tolerance and/or dependence, and alternative treatments discussed;No signs of potential drug abuse or diversion identified.

## 2024-03-22 NOTE — TELEPHONE ENCOUNTER
Called patient, patient is driving to [place] and states she is unable to do a VV until 4:10. Patient would be out of state at this point, so a VV is not an option. Patient informed that provider does not have this time available. Patient wondering what to do since she is unavailable for an appt.

## 2024-03-22 NOTE — TELEPHONE ENCOUNTER
Patient  states that she was at the ortho yesterday and they DX her with a possible blood clot in her arm- she was told that her PCP would have to order testing for that? She  would like you to order her tesing.  Please advise

## 2024-04-10 ENCOUNTER — OFFICE VISIT (OUTPATIENT)
Dept: PRIMARY CARE CLINIC | Age: 54
End: 2024-04-10
Payer: COMMERCIAL

## 2024-04-10 VITALS
HEIGHT: 67 IN | BODY MASS INDEX: 29.63 KG/M2 | SYSTOLIC BLOOD PRESSURE: 112 MMHG | WEIGHT: 188.8 LBS | HEART RATE: 65 BPM | TEMPERATURE: 97.3 F | RESPIRATION RATE: 16 BRPM | OXYGEN SATURATION: 96 % | DIASTOLIC BLOOD PRESSURE: 62 MMHG

## 2024-04-10 DIAGNOSIS — R73.9 HYPERGLYCEMIA: ICD-10-CM

## 2024-04-10 DIAGNOSIS — F31.9 BIPOLAR AFFECTIVE DISORDER, REMISSION STATUS UNSPECIFIED (HCC): ICD-10-CM

## 2024-04-10 DIAGNOSIS — M25.511 ACUTE PAIN OF RIGHT SHOULDER: Primary | ICD-10-CM

## 2024-04-10 LAB — HBA1C MFR BLD: 5.2 %

## 2024-04-10 PROCEDURE — G8427 DOCREV CUR MEDS BY ELIG CLIN: HCPCS | Performed by: NURSE PRACTITIONER

## 2024-04-10 PROCEDURE — 3017F COLORECTAL CA SCREEN DOC REV: CPT | Performed by: NURSE PRACTITIONER

## 2024-04-10 PROCEDURE — G8419 CALC BMI OUT NRM PARAM NOF/U: HCPCS | Performed by: NURSE PRACTITIONER

## 2024-04-10 PROCEDURE — 99214 OFFICE O/P EST MOD 30 MIN: CPT | Performed by: NURSE PRACTITIONER

## 2024-04-10 PROCEDURE — 1036F TOBACCO NON-USER: CPT | Performed by: NURSE PRACTITIONER

## 2024-04-10 PROCEDURE — 83036 HEMOGLOBIN GLYCOSYLATED A1C: CPT | Performed by: NURSE PRACTITIONER

## 2024-04-10 RX ORDER — MELOXICAM 7.5 MG/1
7.5 TABLET ORAL DAILY
Qty: 90 TABLET | Refills: 1 | Status: SHIPPED | OUTPATIENT
Start: 2024-04-10

## 2024-04-10 RX ORDER — QUETIAPINE FUMARATE 150 MG/1
150 TABLET, FILM COATED ORAL NIGHTLY
Qty: 90 TABLET | Refills: 1 | Status: SHIPPED | OUTPATIENT
Start: 2024-04-10

## 2024-04-10 RX ORDER — BUPROPION HYDROCHLORIDE 150 MG/1
150 TABLET ORAL EVERY MORNING
Qty: 90 TABLET | Refills: 1 | Status: SHIPPED | OUTPATIENT
Start: 2024-04-10

## 2024-04-10 RX ORDER — TIZANIDINE 4 MG/1
4 TABLET ORAL EVERY 8 HOURS PRN
Qty: 30 TABLET | Refills: 0 | Status: SHIPPED | OUTPATIENT
Start: 2024-04-10

## 2024-04-10 ASSESSMENT — PATIENT HEALTH QUESTIONNAIRE - PHQ9
SUM OF ALL RESPONSES TO PHQ QUESTIONS 1-9: 5
6. FEELING BAD ABOUT YOURSELF - OR THAT YOU ARE A FAILURE OR HAVE LET YOURSELF OR YOUR FAMILY DOWN: NOT AT ALL
1. LITTLE INTEREST OR PLEASURE IN DOING THINGS: NOT AT ALL
7. TROUBLE CONCENTRATING ON THINGS, SUCH AS READING THE NEWSPAPER OR WATCHING TELEVISION: MORE THAN HALF THE DAYS
4. FEELING TIRED OR HAVING LITTLE ENERGY: SEVERAL DAYS
5. POOR APPETITE OR OVEREATING: SEVERAL DAYS
SUM OF ALL RESPONSES TO PHQ QUESTIONS 1-9: 5
3. TROUBLE FALLING OR STAYING ASLEEP: SEVERAL DAYS
9. THOUGHTS THAT YOU WOULD BE BETTER OFF DEAD, OR OF HURTING YOURSELF: NOT AT ALL
2. FEELING DOWN, DEPRESSED OR HOPELESS: NOT AT ALL
10. IF YOU CHECKED OFF ANY PROBLEMS, HOW DIFFICULT HAVE THESE PROBLEMS MADE IT FOR YOU TO DO YOUR WORK, TAKE CARE OF THINGS AT HOME, OR GET ALONG WITH OTHER PEOPLE: SOMEWHAT DIFFICULT
SUM OF ALL RESPONSES TO PHQ9 QUESTIONS 1 & 2: 0
SUM OF ALL RESPONSES TO PHQ QUESTIONS 1-9: 5
8. MOVING OR SPEAKING SO SLOWLY THAT OTHER PEOPLE COULD HAVE NOTICED. OR THE OPPOSITE, BEING SO FIGETY OR RESTLESS THAT YOU HAVE BEEN MOVING AROUND A LOT MORE THAN USUAL: NOT AT ALL
SUM OF ALL RESPONSES TO PHQ QUESTIONS 1-9: 5

## 2024-04-10 ASSESSMENT — ANXIETY QUESTIONNAIRES
2. NOT BEING ABLE TO STOP OR CONTROL WORRYING: 0-NOT AT ALL
1. FEELING NERVOUS, ANXIOUS, OR ON EDGE: NOT AT ALL
GAD7 TOTAL SCORE: 0
5. BEING SO RESTLESS THAT IT IS HARD TO SIT STILL: 0-NOT AT ALL
6. BECOMING EASILY ANNOYED OR IRRITABLE: 0-NOT AT ALL
7. FEELING AFRAID AS IF SOMETHING AWFUL MIGHT HAPPEN: 0-NOT AT ALL
4. TROUBLE RELAXING: 0-NOT AT ALL
3. WORRYING TOO MUCH ABOUT DIFFERENT THINGS: 0-NOT AT ALL

## 2024-04-10 ASSESSMENT — ENCOUNTER SYMPTOMS
SHORTNESS OF BREATH: 0
COUGH: 0
WHEEZING: 0

## 2024-04-10 NOTE — PROGRESS NOTES
PROGRESS NOTE  Date of Service:  4/10/2024  Address: [facility] PHYSICIAN [facility]  [address]  [facility] [address]  Dept: [phone]  Loc: [phone]    Subjective:      Patient ID: [id number]  Rosa Hernandez is a 53 y.o. female    HPI: patient said that she is still having pain in her right shoulder and neck. Patient said she has been doing stretches at home. Patient said that she was feeling better on the steroids and muscle relaxer. She said that she has been feeling better someday. She is doing voltnern gel, cbd oil gel. Nothing is making a big difference. Patient said steroids and muscle relaxer is what helped the most. Patient pain does radiate down her right arm.         Review of Systems   Constitutional:  Negative for chills, fatigue and fever.   Respiratory:  Negative for cough, shortness of breath and wheezing.    Musculoskeletal:         Right shoulder pain    All other systems reviewed and are negative.    Objective:   Physical Exam  Vitals reviewed.   Constitutional:       Appearance: Normal appearance.   Cardiovascular:      Rate and Rhythm: Normal rate and regular rhythm.      Pulses: Normal pulses.      Heart sounds: Normal heart sounds.   Pulmonary:      Effort: Pulmonary effort is normal.      Breath sounds: Normal breath sounds.   Musculoskeletal:      Right shoulder: Swelling, tenderness and bony tenderness present. Decreased range of motion. Decreased strength.   Skin:     Capillary Refill: Capillary refill takes less than 2 seconds.   Neurological:      General: No focal deficit present.      Mental Status: She is alert and oriented to person, place, and time.   Psychiatric:         Mood and Affect: Mood normal.         Behavior: Behavior normal.         Thought Content: Thought content normal.         Judgment: Judgment normal.         Plan:   1. Acute pain of right shoulder patient's been having right shoulder pain it does radiate down

## 2024-04-29 ENCOUNTER — HOSPITAL ENCOUNTER (OUTPATIENT)
Age: 54
Discharge: HOME | End: 2024-04-29
Payer: COMMERCIAL

## 2024-04-29 DIAGNOSIS — M25.511 ACUTE PAIN OF RIGHT SHOULDER: ICD-10-CM

## 2024-04-29 PROCEDURE — 73221 MRI JOINT UPR EXTREM W/O DYE: CPT

## 2024-04-29 RX ORDER — TIZANIDINE 4 MG/1
4 TABLET ORAL EVERY 8 HOURS PRN
Qty: 30 TABLET | Refills: 0 | Status: SHIPPED | OUTPATIENT
Start: 2024-04-29

## 2024-05-01 DIAGNOSIS — M25.511 ACUTE PAIN OF RIGHT SHOULDER: Primary | ICD-10-CM

## 2024-05-02 SDOH — HEALTH STABILITY: PHYSICAL HEALTH: ON AVERAGE, HOW MANY DAYS PER WEEK DO YOU ENGAGE IN MODERATE TO STRENUOUS EXERCISE (LIKE A BRISK WALK)?: 4 DAYS

## 2024-05-02 SDOH — HEALTH STABILITY: PHYSICAL HEALTH: ON AVERAGE, HOW MANY MINUTES DO YOU ENGAGE IN EXERCISE AT THIS LEVEL?: 30 MIN

## 2024-05-03 ENCOUNTER — HOSPITAL ENCOUNTER (OUTPATIENT)
Age: 54
Discharge: HOME | End: 2024-05-03
Payer: COMMERCIAL

## 2024-05-03 ENCOUNTER — OFFICE VISIT (OUTPATIENT)
Dept: ORTHOPEDIC SURGERY | Age: 54
End: 2024-05-03

## 2024-05-03 VITALS — HEIGHT: 67 IN | BODY MASS INDEX: 29.51 KG/M2 | WEIGHT: 188 LBS

## 2024-05-03 DIAGNOSIS — M25.511 RIGHT SHOULDER PAIN, UNSPECIFIED CHRONICITY: Primary | ICD-10-CM

## 2024-05-03 DIAGNOSIS — M75.81 ROTATOR CUFF TENDONITIS, RIGHT: ICD-10-CM

## 2024-05-03 DIAGNOSIS — M54.2 NECK PAIN: ICD-10-CM

## 2024-05-03 DIAGNOSIS — M79.89 SWELLING OF RIGHT HAND: ICD-10-CM

## 2024-05-03 DIAGNOSIS — Z95.828 PORT-A-CATH IN PLACE: Primary | ICD-10-CM

## 2024-05-03 PROCEDURE — 93971 EXTREMITY STUDY: CPT

## 2024-05-03 NOTE — PROGRESS NOTES
Date:  5/3/2024    Name:  Rosa Hernandez  Address:  [address] [place] [id number]    :  1970      Age:   53 y.o.    SSN:        Medical Record Number:  [id number]    Reason for Visit:    Chief Complaint    New Patient (RIGHT SHOULDER)      DOS:5/3/2024     HPI: Rosa Hernandez is a 53 y.o. female here today for evaluation of right shoulder pain that has been ongoing for 2 months. 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 She complains of pain and limited range of motion of the neck as well as right shoulder pain. She has also had recurrent swelling and discoloration of right arm accompanied by weakness and severe pain. The pain wakes her up at night. She has been prescribed steroids and muscle relaxors with minimal improvement. An MRI was obtained of the right shoulder she brings with her for review today. Of note, she has a history of blood clots and has a power port in the right subclavian vein for venous access. She also has a history of cervical fusion surgery.      Pain Assessment  Location of Pain: Shoulder  Location Modifiers: Right  Severity of Pain: 5  Quality of Pain: Sharp, Dull, Aching  Duration of Pain: Persistent  Frequency of Pain: Constant  Aggravating Factors:  (moving)  Limiting Behavior: Yes  Result of Injury: No  Work-Related Injury: No  Are there other pain locations you wish to document?: No  ROS: Review of systems reviewed from Patient History Form completed today and available in the patient's chart under the Media tab.       Past Medical History:   Diagnosis Date    Acute CVA (cerebrovascular accident) ([facility]) 2021    Last Assessment & Plan:  Formatting of this note might be different from the original. 51-year-old female admitted for acute CVA.  CT head and CTA head and neck negative Stroke neuro consulted from the ED.  NIH 5. TPA running Critical care  no

## 2024-05-08 DIAGNOSIS — F31.9 BIPOLAR AFFECTIVE DISORDER, REMISSION STATUS UNSPECIFIED (HCC): ICD-10-CM

## 2024-05-08 RX ORDER — BUPROPION HYDROCHLORIDE 300 MG/1
300 TABLET ORAL EVERY MORNING
Qty: 90 TABLET | Refills: 1 | Status: SHIPPED | OUTPATIENT
Start: 2024-05-08

## 2024-05-09 ENCOUNTER — OFFICE VISIT (OUTPATIENT)
Age: 54
End: 2024-05-09
Payer: COMMERCIAL

## 2024-05-09 ENCOUNTER — TELEPHONE (OUTPATIENT)
Dept: ORTHOPEDIC SURGERY | Age: 54
End: 2024-05-09

## 2024-05-09 ENCOUNTER — TELEPHONE (OUTPATIENT)
Dept: VASCULAR SURGERY | Age: 54
End: 2024-05-09

## 2024-05-09 VITALS
HEIGHT: 68 IN | DIASTOLIC BLOOD PRESSURE: 74 MMHG | OXYGEN SATURATION: 98 % | HEART RATE: 72 BPM | SYSTOLIC BLOOD PRESSURE: 128 MMHG | BODY MASS INDEX: 28.64 KG/M2 | WEIGHT: 189 LBS

## 2024-05-09 DIAGNOSIS — M79.601 PAIN AND SWELLING OF RIGHT UPPER EXTREMITY: Primary | ICD-10-CM

## 2024-05-09 DIAGNOSIS — M79.89 PAIN AND SWELLING OF RIGHT UPPER EXTREMITY: Primary | ICD-10-CM

## 2024-05-09 PROCEDURE — G8419 CALC BMI OUT NRM PARAM NOF/U: HCPCS | Performed by: SURGERY

## 2024-05-09 PROCEDURE — 1036F TOBACCO NON-USER: CPT | Performed by: SURGERY

## 2024-05-09 PROCEDURE — G8427 DOCREV CUR MEDS BY ELIG CLIN: HCPCS | Performed by: SURGERY

## 2024-05-09 PROCEDURE — 99204 OFFICE O/P NEW MOD 45 MIN: CPT | Performed by: SURGERY

## 2024-05-09 PROCEDURE — 3017F COLORECTAL CA SCREEN DOC REV: CPT | Performed by: SURGERY

## 2024-05-09 ASSESSMENT — ENCOUNTER SYMPTOMS: COLOR CHANGE: 1

## 2024-05-09 NOTE — TELEPHONE ENCOUNTER
Patient referred in by Dr. Darrick Camara for M79.601, M79.89 (ICD-10-CM) - Pain and swelling of right upper extremity.    Patient states she has a port in place and the surgeons believe there is a pinch in patient's right arm causing the arm to swell, go numb, and turn purple.     Patient can be reached at [phone]

## 2024-05-09 NOTE — TELEPHONE ENCOUNTER
Appointment Request     Patient requesting earlier appointment: Yes  Appointment offered to patient: 06/04/2024  Patient Contact Number: [phone]      PATIENT WOULD LIKE TO BE WORKED IN 5/21 FOR CERVICAL SPINE. SHE HAS HAD SPINE FUSION IN THE PAST. PATIENT IS CURRENTLY WAITING ON POSSIBLY GETTING PORT REPLACED BUT SHE NEEDS TO SEE SPINE ASAP

## 2024-05-09 NOTE — PROGRESS NOTES
PRAPARE - Transportation     Lack of Transportation (Medical): Not on file     Lack of Transportation (Non-Medical): No   Physical Activity: Insufficiently Active (5/2/2024)    Exercise Vital Sign     Days of Exercise per Week: 4 days     Minutes of Exercise per Session: 30 min   Stress: Not on file   Social Connections: Not on file   Intimate Partner Violence: Not on file   Housing Stability: Unknown (11/13/2023)    Housing Stability Vital Sign     Unable to Pay for Housing in the Last Year: Not on file     Number of Places Lived in the Last Year: Not on file     Unstable Housing in the Last Year: No       Family History   Problem Relation Age of Onset    Drug Abuse Mother     Hypertension Mother     Hypertension Sister     Depression Sister     Drug Abuse Brother     Drug Abuse Brother     Breast Cancer Maternal Aunt        There were no vitals filed for this visit.    Review of Systems   Musculoskeletal:  Positive for neck pain.   Skin:  Positive for color change.   All other systems reviewed and are negative.         Objective   Physical Exam  Vitals reviewed.   Constitutional:       General: She is not in acute distress.     Appearance: She is well-developed. She is not diaphoretic.   HENT:      Head: Normocephalic and atraumatic.      Right Ear: External ear normal.      Left Ear: External ear normal.      Nose: Nose normal.   Eyes:      General: No scleral icterus.     Conjunctiva/sclera: Conjunctivae normal.   Cardiovascular:      Pulses:           Radial pulses are 2+ on the right side.      Comments: Right IJ port  Pulmonary:      Effort: Pulmonary effort is normal. No respiratory distress.   Abdominal:      General: There is no distension.      Palpations: Abdomen is soft.      Tenderness: There is no abdominal tenderness.   Musculoskeletal:         General: Normal range of motion.      Cervical back: Normal range of motion and neck supple.   Skin:     General: Skin is warm and dry.      Findings: No

## 2024-05-13 NOTE — TELEPHONE ENCOUNTER
[facility] for Ms. Mary to call office. Per Dr. Gill she needs to see general surgeon to have port removed be thoracic outlet evaluation.

## 2024-05-15 ENCOUNTER — OFFICE VISIT (OUTPATIENT)
Dept: ORTHOPEDIC SURGERY | Age: 54
End: 2024-05-15
Payer: COMMERCIAL

## 2024-05-15 VITALS — BODY MASS INDEX: 29.65 KG/M2 | WEIGHT: 188.93 LBS | HEIGHT: 67 IN

## 2024-05-15 DIAGNOSIS — M47.22 CERVICAL SPONDYLOSIS WITH RADICULOPATHY: Primary | ICD-10-CM

## 2024-05-15 PROCEDURE — 3017F COLORECTAL CA SCREEN DOC REV: CPT | Performed by: ORTHOPAEDIC SURGERY

## 2024-05-15 PROCEDURE — 99213 OFFICE O/P EST LOW 20 MIN: CPT | Performed by: ORTHOPAEDIC SURGERY

## 2024-05-15 PROCEDURE — 1036F TOBACCO NON-USER: CPT | Performed by: ORTHOPAEDIC SURGERY

## 2024-05-15 PROCEDURE — G8419 CALC BMI OUT NRM PARAM NOF/U: HCPCS | Performed by: ORTHOPAEDIC SURGERY

## 2024-05-15 PROCEDURE — G8427 DOCREV CUR MEDS BY ELIG CLIN: HCPCS | Performed by: ORTHOPAEDIC SURGERY

## 2024-05-15 NOTE — PROGRESS NOTES
Relation Age of Onset    Drug Abuse Mother     Hypertension Mother     Hypertension Sister     Depression Sister     Drug Abuse Brother     Drug Abuse Brother     Breast Cancer Maternal Aunt        REVIEW OF SYSTEMS: Full ROS noted & scanned   CONSTITUTIONAL: Denies unexplained weight loss, fevers, chills or fatigue  NEUROLOGICAL: Denies unsteady gait or progressive weakness  MUSCULOSKELETAL: Denies joint swelling or redness  PSYCHOLOGICAL: Denies anxiety, depression   SKIN: Denies skin changes, delayed healing, rash, itching   HEMATOLOGIC: Denies easy bleeding or bruising  ENDOCRINE: Denies excessive thirst, urination, heat/cold  RESPIRATORY: Denies current dyspnea, cough  GI: Denies nausea, vomiting, diarrhea   : Denies bowel or bladder issues       PHYSICAL EXAM:    Vitals: Height 1.702 m (5' 7\"), weight 85.7 kg (188 lb 15 oz), last menstrual period 07/09/2016, not currently breastfeeding.    GENERAL EXAM:  General Apparence: Patient is adequately groomed with no evidence of malnutrition.  Orientation: The patient is oriented to time, place and person.   Mood & Affect:The patient's mood and affect are appropriate   Vascular: Examination reveals no swelling tenderness in upper or lower extremities. Good capillary refill  Lymphatic: The lymphatic examination bilaterally reveals all areas to be without enlargement or induration  Sensation: Sensation is intact without deficit  Coordination/Balance: Good coordination     CERVICAL EXAMINATION:  Inspection: Local inspection shows no step-off or bruising.  Cervical alignment is normal.     Palpation: No evidence of tenderness at the midline, and trapezius.  Paraspinal tenderness is present. There is no step-off or paraspinal spasm.   Range of Motion: Cervical flexion, extension, and rotation are mildly reduced with pain.  Strength: 5/5 bilateral upper extremities   Special Tests:     Chaudhry's negative bilaterally.       Cubital and Carpal tunnel Tinel's negative

## 2024-05-16 ENCOUNTER — TELEPHONE (OUTPATIENT)
Dept: ORTHOPEDIC SURGERY | Age: 54
End: 2024-05-16

## 2024-05-16 NOTE — TELEPHONE ENCOUNTER
[facility] Clinical Staff  No pre-cert required for MRI - Cervical  Patient's insurance is [facility].  Patients insurance policy does NOT require a pre-cert.    Spoke to patient advising that the MRI did not require a pre-cert and they can call to schedule. Advised that patient should make a follow-up with Dr. Barclay 2-3 days after the MRI for review.

## 2024-05-17 ENCOUNTER — PATIENT MESSAGE (OUTPATIENT)
Dept: PRIMARY CARE CLINIC | Age: 54
End: 2024-05-17

## 2024-05-17 DIAGNOSIS — M54.42 CHRONIC BILATERAL LOW BACK PAIN WITH LEFT-SIDED SCIATICA: ICD-10-CM

## 2024-05-17 DIAGNOSIS — G89.29 CHRONIC BILATERAL LOW BACK PAIN WITH LEFT-SIDED SCIATICA: ICD-10-CM

## 2024-05-20 RX ORDER — LIDOCAINE 50 MG/G
1 PATCH TOPICAL DAILY
Qty: 30 PATCH | Refills: 1 | Status: SHIPPED | OUTPATIENT
Start: 2024-05-20 | End: 2024-07-19

## 2024-05-20 NOTE — TELEPHONE ENCOUNTER
Medication:   Requested Prescriptions     Pending Prescriptions Disp Refills    lidocaine (LIDODERM) 5 % 30 patch 1     Sig: Place 1 patch onto the skin daily 12 hours on, 12 hours off.        Last Filled:  7/27/2023 #30 w/ 1 refill     Patient Phone Number: [phone] (home)     Last appt: 4/10/2024   Next appt: 6/5/2024    Last OARRS:       6/28/2017     2:53 PM   RX Monitoring   Attestation The Prescription Monitoring Report for this patient was reviewed today.   Periodic Controlled Substance Monitoring Possible medication side effects, risk of tolerance and/or dependence, and alternative treatments discussed;No signs of potential drug abuse or diversion identified.

## 2024-05-20 NOTE — TELEPHONE ENCOUNTER
From: Rosa Hernandez  To: Charline Carrasquillo  Sent: 5/17/2024 4:22 PM EDT  Subject: Patches    Could you please call me in a refill for my patches please. Thank you    Rosa

## 2024-05-23 ENCOUNTER — TELEPHONE (OUTPATIENT)
Dept: ADMINISTRATIVE | Age: 54
End: 2024-05-23

## 2024-05-23 NOTE — TELEPHONE ENCOUNTER
Submitted PA for Lidocaine 5% patches   Via CMM (Key: [id number]) STATUS: PENDING.    Follow up done daily; if no decision with in three days we will refax.  If another three days goes by with no decision will call the insurance for status.

## 2024-05-24 NOTE — TELEPHONE ENCOUNTER
The medication is APPROVED THRU 05/23/2025    If this requires a response please respond to the pool ( P MHCX PSC MEDICATION PRE-AUTH).      Thank you please advise patient.

## 2024-05-29 RX ORDER — ATORVASTATIN CALCIUM 40 MG/1
TABLET, FILM COATED ORAL
COMMUNITY

## 2024-05-29 RX ORDER — FLUTICASONE PROPIONATE 50 MCG
SPRAY, SUSPENSION (ML) NASAL
COMMUNITY
Start: 2021-02-02

## 2024-05-29 RX ORDER — SUCRALFATE 1 G/1
TABLET ORAL
COMMUNITY

## 2024-05-29 RX ORDER — ASPIRIN 81 MG/1
TABLET ORAL
COMMUNITY

## 2024-05-29 RX ORDER — POLYMYXIN B SULFATE AND TRIMETHOPRIM 1; 10000 MG/ML; [USP'U]/ML
SOLUTION OPHTHALMIC
COMMUNITY

## 2024-05-29 RX ORDER — PROMETHAZINE HYDROCHLORIDE 25 MG/1
TABLET ORAL
COMMUNITY
Start: 2024-05-12

## 2024-07-09 ENCOUNTER — OFFICE VISIT (OUTPATIENT)
Dept: PRIMARY CARE CLINIC | Age: 54
End: 2024-07-09
Payer: COMMERCIAL

## 2024-07-09 VITALS
RESPIRATION RATE: 16 BRPM | DIASTOLIC BLOOD PRESSURE: 76 MMHG | SYSTOLIC BLOOD PRESSURE: 120 MMHG | BODY MASS INDEX: 28.41 KG/M2 | HEART RATE: 72 BPM | HEIGHT: 67 IN | OXYGEN SATURATION: 98 % | TEMPERATURE: 98.6 F | WEIGHT: 181 LBS

## 2024-07-09 DIAGNOSIS — Z00.00 ANNUAL PHYSICAL EXAM: Primary | ICD-10-CM

## 2024-07-09 DIAGNOSIS — Z12.11 SCREENING FOR MALIGNANT NEOPLASM OF COLON: ICD-10-CM

## 2024-07-09 PROCEDURE — 99386 PREV VISIT NEW AGE 40-64: CPT | Performed by: NURSE PRACTITIONER

## 2024-07-09 RX ORDER — HEPARIN SODIUM (PORCINE) LOCK FLUSH IV SOLN 100 UNIT/ML 100 UNIT/ML
100 SOLUTION INTRAVENOUS ONCE
Status: DISCONTINUED | OUTPATIENT
Start: 2024-07-09 | End: 2024-07-10

## 2024-07-09 ASSESSMENT — ENCOUNTER SYMPTOMS
WHEEZING: 0
COUGH: 0
SHORTNESS OF BREATH: 0
COLOR CHANGE: 0

## 2024-07-09 NOTE — PROGRESS NOTES
PROGRESS NOTE  Date of Service:  2024    SUBJECTIVE:  Patient ID: Rosa Hernandez is a 54 y.o. female    HPI: here for annual exam   Safety discussed  No sob no cp at rest  no n/v/d  No exercise  No smoking  Social etoh  Bm not daily every 7 days not new  Needs to schedule colonoscopy  No recent pap hysterectomy ? Cervix removal years ago  Mammogram ordered    Past Medical History:   Diagnosis Date    Acute CVA (cerebrovascular accident) ([facility]) 2021    Last Assessment & Plan:  Formatting of this note might be different from the original. 51-year-old female admitted for acute CVA.  CT head and CTA head and neck negative Stroke neuro consulted from the ED.  NIH 5. TPA running Critical care neurology consulted MRI, TTE with bubble study in a.m. PT/OT/speech therapy N.p.o. pending swallow study Lipid panel, A1c ordered Start aspirin 81 mg    Bipolar disorder ([facility])     Chronic back pain     Cyst of right ovary 2022    DDD (degenerative disc disease), lumbar     L4-5, L5-S1    Degeneration of lumbar or lumbosacral intervertebral disc 10/02/2017    Depression with anxiety     Drug abuse ([facility]) 2014    GI bleed 2016    Headache     HNP (herniated nucleus pulposus), lumbar 2018    Hx of blood clots     Lumbar radiculopathy 2019    Mitral valve prolapse     Pulmonary emboli ([facility]) 1997X2    after angio - smoking    Pyelonephritis 2017    right    SVT (supraventricular tachycardia) ([facility])       Past Surgical History:   Procedure Laterality Date    APPENDECTOMY  2008    BACK SURGERY       SECTION       SECTION      DILATION AND CURETTAGE OF UTERUS  2012    ELBOW SURGERY Right 2017    Dr. Talavera    ENDOMETRIAL ABLATION      FRACTURE SURGERY      HYSTERECTOMY (CERVIX STATUS UNKNOWN)      HYSTERECTOMY, TOTAL ABDOMINAL (CERVIX REMOVED)  2016    LUMBAR SPINE SURGERY  ,    L5-S1    UPPER GASTROINTESTINAL ENDOSCOPY  2008    WRIST SURGERY  2016          Social

## 2024-07-09 NOTE — ASSESSMENT & PLAN NOTE
Pt needs exam for insurance  Ordered colonoscopoy and mammogram as screening tests  Labs will be drawn and resulted pt will be notified in mychart or in person and return for form to submit to insurance company

## 2024-07-22 DIAGNOSIS — M54.42 CHRONIC BILATERAL LOW BACK PAIN WITH LEFT-SIDED SCIATICA: ICD-10-CM

## 2024-07-22 DIAGNOSIS — F31.9 BIPOLAR AFFECTIVE DISORDER, REMISSION STATUS UNSPECIFIED (HCC): ICD-10-CM

## 2024-07-22 DIAGNOSIS — G89.29 CHRONIC BILATERAL LOW BACK PAIN WITH LEFT-SIDED SCIATICA: ICD-10-CM

## 2024-07-23 DIAGNOSIS — Z00.00 ANNUAL PHYSICAL EXAM: ICD-10-CM

## 2024-07-23 LAB
REASON FOR REJECTION: NORMAL
REJECTED TEST: NORMAL

## 2024-07-23 RX ORDER — LIDOCAINE 50 MG/G
1 PATCH TOPICAL DAILY
Qty: 30 PATCH | Refills: 1 | OUTPATIENT
Start: 2024-07-23 | End: 2024-09-21

## 2024-07-23 RX ORDER — QUETIAPINE FUMARATE 150 MG/1
1 TABLET, FILM COATED ORAL NIGHTLY
Qty: 30 TABLET | OUTPATIENT
Start: 2024-07-23

## 2024-07-23 RX ORDER — BUPROPION HYDROCHLORIDE 150 MG/1
150 TABLET ORAL EVERY MORNING
Qty: 30 TABLET | OUTPATIENT
Start: 2024-07-23

## 2024-07-24 ENCOUNTER — TELEPHONE (OUTPATIENT)
Dept: PRIMARY CARE CLINIC | Age: 54
End: 2024-07-24

## 2024-07-24 NOTE — TELEPHONE ENCOUNTER
Gina at [facility] will be calling patient for recollect of specimen that was contaminated during a port collection at the Outpatient services.  No further action is needed for this encounter.

## 2024-07-24 NOTE — TELEPHONE ENCOUNTER
Gina from [facility] is calling. She states that the CMP and Lipid test were rejected. The specimens were contaminated.    Gina's phone number is [phone]

## 2024-07-25 ENCOUNTER — HOSPITAL ENCOUNTER (OUTPATIENT)
Dept: INFUSION THERAPY | Age: 54
Setting detail: INFUSION SERIES
Discharge: HOME | End: 2024-07-25
Payer: COMMERCIAL

## 2024-07-25 LAB
ALBUMIN SERPL-MCNC: 4.6 G/DL (ref 3.4–5)
ALBUMIN/GLOB SERPL: 1.8 {RATIO} (ref 1.1–2.2)
ALP SERPL-CCNC: 70 U/L (ref 40–129)
ALT SERPL-CCNC: 14 U/L (ref 10–40)
ANION GAP SERPL CALCULATED.3IONS-SCNC: 9 MMOL/L (ref 3–16)
AST SERPL-CCNC: 18 U/L (ref 15–37)
BILIRUB SERPL-MCNC: 0.5 MG/DL (ref 0–1)
BUN SERPL-MCNC: 13 MG/DL (ref 7–20)
CALCIUM SERPL-MCNC: 9.2 MG/DL (ref 8.3–10.6)
CHLORIDE SERPL-SCNC: 101 MMOL/L (ref 99–110)
CHOLEST SERPL-MCNC: 264 MG/DL (ref 0–199)
CO2 SERPL-SCNC: 28 MMOL/L (ref 21–32)
CREAT SERPL-MCNC: 0.6 MG/DL (ref 0.6–1.1)
GFR SERPLBLD CREATININE-BSD FMLA CKD-EPI: >90 ML/MIN/{1.73_M2}
GLUCOSE SERPL-MCNC: 94 MG/DL (ref 70–99)
HDLC SERPL-MCNC: 88 MG/DL (ref 40–60)
LDLC SERPL CALC-MCNC: 158 MG/DL
POTASSIUM SERPL-SCNC: 4.1 MMOL/L (ref 3.5–5.1)
PROT SERPL-MCNC: 7.2 G/DL (ref 6.4–8.2)
SODIUM SERPL-SCNC: 138 MMOL/L (ref 136–145)
TRIGL SERPL-MCNC: 91 MG/DL (ref 0–150)
VLDLC SERPL CALC-MCNC: 18 MG/DL

## 2024-07-25 PROCEDURE — 80053 COMPREHEN METABOLIC PANEL: CPT

## 2024-07-25 PROCEDURE — 6360000002 HC RX W HCPCS: Performed by: NURSE PRACTITIONER

## 2024-07-25 PROCEDURE — 2580000003 HC RX 258: Performed by: NURSE PRACTITIONER

## 2024-07-25 PROCEDURE — 80061 LIPID PANEL: CPT

## 2024-07-25 PROCEDURE — 36591 DRAW BLOOD OFF VENOUS DEVICE: CPT

## 2024-07-25 RX ORDER — HEPARIN SODIUM (PORCINE) LOCK FLUSH IV SOLN 100 UNIT/ML 100 UNIT/ML
500 SOLUTION INTRAVENOUS PRN
Status: DISCONTINUED | OUTPATIENT
Start: 2024-07-25 | End: 2024-07-26 | Stop reason: HOSPADM

## 2024-07-25 RX ORDER — SODIUM CHLORIDE 0.9 % (FLUSH) 0.9 %
5-40 SYRINGE (ML) INJECTION 2 TIMES DAILY
Status: DISCONTINUED | OUTPATIENT
Start: 2024-07-25 | End: 2024-07-26 | Stop reason: HOSPADM

## 2024-07-25 RX ADMIN — SODIUM CHLORIDE, PRESERVATIVE FREE 30 ML: 5 INJECTION INTRAVENOUS at 15:19

## 2024-07-25 RX ADMIN — HEPARIN SODIUM (PORCINE) LOCK FLUSH IV SOLN 100 UNIT/ML 500 UNITS: 100 SOLUTION at 15:19

## 2024-07-25 NOTE — PROGRESS NOTES
Outpatient Infusion Center   [facility]    Port Access for Labs    NAME:  Rosa Hernandez  YOB: 1970  MEDICAL RECORD NUMBER:  [id number]  DATE:  7/25/2024    Patient arrived to Outpatient Infusion Center   [] per wheelchair   [x] ambulatory     Patient alert and oriented x4 . Patient has no complaints at this time.    Port Site Location:  right chest    Port Site:  Redness: No  Bruising: No   Edema: No  Pain: No     Port Site cleansed with:  Chloroprep Scrub for 30 seconds and air dried? Yes    Port Accessed with: 20 Gauge 1 inch Power Port needle using sterile technique.  Blood return obtained: Yes    Labs:  Blood wasted: 15 ml  Labs drawn using a Vacutainer/Syringe: Yes  Flushed with 30 ml Normal Saline and a heparin flush using push-pause method.  Port flushes without resistance.    Port Deaccessed:  Yes    Response to treatment:  Well tolerated by patient.    Education:    Verbalized understanding    Electronically signed by Anamika Long RN on 7/25/2024 at 3:16 PM

## 2024-08-02 NOTE — ADDENDUM NOTE
Encounter addended by: Estela Rhoades MA on: 8/2/2024 2:59 PM   Actions taken: Document created, Document edited

## 2024-08-08 PROBLEM — Z12.11 SCREENING FOR MALIGNANT NEOPLASM OF COLON: Status: RESOLVED | Noted: 2024-07-09 | Resolved: 2024-08-08

## 2024-08-08 PROBLEM — Z00.00 ANNUAL PHYSICAL EXAM: Status: RESOLVED | Noted: 2024-07-09 | Resolved: 2024-08-08

## 2024-09-01 DIAGNOSIS — G89.29 CHRONIC BILATERAL LOW BACK PAIN WITH LEFT-SIDED SCIATICA: ICD-10-CM

## 2024-09-01 DIAGNOSIS — M54.42 CHRONIC BILATERAL LOW BACK PAIN WITH LEFT-SIDED SCIATICA: ICD-10-CM

## 2024-09-03 RX ORDER — LIDOCAINE 50 MG/G
1 PATCH TOPICAL DAILY
Qty: 30 PATCH | Refills: 1 | Status: SHIPPED | OUTPATIENT
Start: 2024-09-03 | End: 2024-11-02

## 2024-09-03 NOTE — TELEPHONE ENCOUNTER
Medication:   Requested Prescriptions     Pending Prescriptions Disp Refills    lidocaine (LIDODERM) 5 % [Pharmacy Med Name: LIDOCAINE 5% PATCH] 30 patch 1     Sig: PLACE 1 PATCH ONTO THE SKIN DAILY 12 HOURS ON, 12 HOURS OFF.        Last Filled:  5/20/2024, 30 patches    Patient Phone Number: [phone] (home)     Last appt: 4/10/2024   Next appt: Visit date not found    Last OARRS:       6/28/2017     2:53 PM   RX Monitoring   Attestation The Prescription Monitoring Report for this patient was reviewed today.   Periodic Controlled Substance Monitoring Possible medication side effects, risk of tolerance and/or dependence, and alternative treatments discussed;No signs of potential drug abuse or diversion identified.

## 2024-09-08 DIAGNOSIS — F31.9 BIPOLAR AFFECTIVE DISORDER, REMISSION STATUS UNSPECIFIED (HCC): ICD-10-CM

## 2024-09-09 RX ORDER — BUPROPION HYDROCHLORIDE 150 MG/1
150 TABLET ORAL EVERY MORNING
Qty: 30 TABLET | OUTPATIENT
Start: 2024-09-09

## 2024-09-09 NOTE — TELEPHONE ENCOUNTER
Medication:   Requested Prescriptions     Pending Prescriptions Disp Refills    buPROPion (WELLBUTRIN XL) 150 MG extended release tablet [Pharmacy Med Name: buPROPion HCL  MG TABLET] 30 tablet      Sig: TAKE 1 TABLET BY MOUTH EVERY MORNING        Last Filled:  [id number]    Patient Phone Number: [phone] (home)     Last appt: 4/10/2024   Next appt: Visit date not found    Last OARRS:       6/28/2017     2:53 PM   RX Monitoring   Attestation The Prescription Monitoring Report for this patient was reviewed today.   Periodic Controlled Substance Monitoring Possible medication side effects, risk of tolerance and/or dependence, and alternative treatments discussed;No signs of potential drug abuse or diversion identified.

## 2024-10-05 DIAGNOSIS — F31.9 BIPOLAR AFFECTIVE DISORDER, REMISSION STATUS UNSPECIFIED (HCC): ICD-10-CM

## 2024-10-07 RX ORDER — QUETIAPINE FUMARATE 150 MG/1
1 TABLET, FILM COATED ORAL NIGHTLY
Qty: 30 TABLET | Refills: 0 | Status: SHIPPED | OUTPATIENT
Start: 2024-10-07 | End: 2024-10-07 | Stop reason: SDUPTHER

## 2024-10-07 RX ORDER — QUETIAPINE FUMARATE 150 MG/1
1 TABLET, FILM COATED ORAL NIGHTLY
Qty: 30 TABLET | Refills: 0 | Status: SHIPPED | OUTPATIENT
Start: 2024-10-07 | End: 2024-10-10 | Stop reason: SDUPTHER

## 2024-10-07 NOTE — TELEPHONE ENCOUNTER
Patient scheduled for Thursday @10:30am, this is her only day off this week. Patient requesting refill before appt.

## 2024-10-07 NOTE — TELEPHONE ENCOUNTER
Requested patient to call back to schedule FU appt, per CW.  Message sent to patient MC.    Medication:   Requested Prescriptions     Pending Prescriptions Disp Refills    QUEtiapine Fumarate 150 MG TABS [Pharmacy Med Name: QUETIAPINE 150 MG TABLET] 30 tablet      Sig: TAKE ONE TABLET BY MOUTH ONCE NIGHTLY        Last Filled:  [id number] #90 x 1    Patient Phone Number: [phone] (home)     Last appt: 4/10/2024   Next appt:   Return in about 8 weeks (around 6/5/2024) for mood.    Last OARRS:       6/28/2017     2:53 PM   RX Monitoring   Attestation The Prescription Monitoring Report for this patient was reviewed today.   Periodic Controlled Substance Monitoring Possible medication side effects, risk of tolerance and/or dependence, and alternative treatments discussed;No signs of potential drug abuse or diversion identified.

## 2024-10-10 ENCOUNTER — OFFICE VISIT (OUTPATIENT)
Dept: PRIMARY CARE CLINIC | Age: 54
End: 2024-10-10
Payer: COMMERCIAL

## 2024-10-10 VITALS
HEIGHT: 67 IN | SYSTOLIC BLOOD PRESSURE: 112 MMHG | BODY MASS INDEX: 23.54 KG/M2 | OXYGEN SATURATION: 98 % | HEART RATE: 79 BPM | WEIGHT: 150 LBS | DIASTOLIC BLOOD PRESSURE: 78 MMHG | RESPIRATION RATE: 16 BRPM | TEMPERATURE: 97.4 F

## 2024-10-10 DIAGNOSIS — K21.9 GASTROESOPHAGEAL REFLUX DISEASE WITHOUT ESOPHAGITIS: Primary | ICD-10-CM

## 2024-10-10 DIAGNOSIS — M54.41 ACUTE RIGHT-SIDED LOW BACK PAIN WITH RIGHT-SIDED SCIATICA: ICD-10-CM

## 2024-10-10 DIAGNOSIS — F51.01 PRIMARY INSOMNIA: ICD-10-CM

## 2024-10-10 DIAGNOSIS — F31.9 BIPOLAR AFFECTIVE DISORDER, REMISSION STATUS UNSPECIFIED (HCC): ICD-10-CM

## 2024-10-10 DIAGNOSIS — H69.93 EUSTACHIAN TUBE DISORDER, BILATERAL: ICD-10-CM

## 2024-10-10 PROCEDURE — 1036F TOBACCO NON-USER: CPT | Performed by: NURSE PRACTITIONER

## 2024-10-10 PROCEDURE — G8427 DOCREV CUR MEDS BY ELIG CLIN: HCPCS | Performed by: NURSE PRACTITIONER

## 2024-10-10 PROCEDURE — G8484 FLU IMMUNIZE NO ADMIN: HCPCS | Performed by: NURSE PRACTITIONER

## 2024-10-10 PROCEDURE — 99214 OFFICE O/P EST MOD 30 MIN: CPT | Performed by: NURSE PRACTITIONER

## 2024-10-10 PROCEDURE — 3017F COLORECTAL CA SCREEN DOC REV: CPT | Performed by: NURSE PRACTITIONER

## 2024-10-10 PROCEDURE — G8420 CALC BMI NORM PARAMETERS: HCPCS | Performed by: NURSE PRACTITIONER

## 2024-10-10 RX ORDER — BUPROPION HYDROCHLORIDE 300 MG/1
300 TABLET ORAL EVERY MORNING
Qty: 90 TABLET | Refills: 1 | Status: SHIPPED | OUTPATIENT
Start: 2024-10-10

## 2024-10-10 RX ORDER — OMEPRAZOLE 40 MG/1
40 CAPSULE, DELAYED RELEASE ORAL
Qty: 90 CAPSULE | Refills: 1 | Status: SHIPPED | OUTPATIENT
Start: 2024-10-10

## 2024-10-10 RX ORDER — QUETIAPINE FUMARATE 150 MG/1
1 TABLET, FILM COATED ORAL NIGHTLY
Qty: 90 TABLET | Refills: 1 | Status: SHIPPED | OUTPATIENT
Start: 2024-10-10

## 2024-10-10 RX ORDER — TRAZODONE HYDROCHLORIDE 100 MG/1
100 TABLET ORAL NIGHTLY
Qty: 90 TABLET | Refills: 1 | Status: SHIPPED | OUTPATIENT
Start: 2024-10-10

## 2024-10-10 RX ORDER — FLUTICASONE PROPIONATE 50 MCG
2 SPRAY, SUSPENSION (ML) NASAL DAILY
Qty: 16 G | Refills: 5 | Status: SHIPPED | OUTPATIENT
Start: 2024-10-10

## 2024-10-10 ASSESSMENT — PATIENT HEALTH QUESTIONNAIRE - PHQ9
1. LITTLE INTEREST OR PLEASURE IN DOING THINGS: NOT AT ALL
SUM OF ALL RESPONSES TO PHQ QUESTIONS 1-9: 0
SUM OF ALL RESPONSES TO PHQ9 QUESTIONS 1 & 2: 0
2. FEELING DOWN, DEPRESSED OR HOPELESS: NOT AT ALL
SUM OF ALL RESPONSES TO PHQ QUESTIONS 1-9: 0

## 2024-10-10 ASSESSMENT — ENCOUNTER SYMPTOMS
SHORTNESS OF BREATH: 0
STRIDOR: 0
WHEEZING: 0

## 2024-10-10 NOTE — PATIENT INSTRUCTIONS
[email or web address]     [email or web address]    Podiatry   [facility]  [address]  [place] [address]  Phone: [phone]  Fax: [phone]

## 2024-10-10 NOTE — PROGRESS NOTES
empty stomach   -     omeprazole (PRILOSEC) 40 MG delayed release capsule; Take 1 capsule by mouth every morning (before breakfast), Disp-90 capsule, R-1Normal  2. Bipolar affective disorder, remission status unspecified (HCC)- patient is doing well mentally although she does feel her adhd is not well controlled will refer her to psychiatrist   -     buPROPion (WELLBUTRIN XL) 300 MG extended release tablet; Take 1 tablet by mouth every morning, Disp-90 tablet, R-1Normal  -     QUEtiapine Fumarate 150 MG TABS; Take 1 tablet by mouth nightly, Disp-90 tablet, R-1Normal  3. Primary insomnia- patient is sleeping well on medication.   -     traZODone (DESYREL) 100 MG tablet; Take 1 tablet by mouth nightly, Disp-90 tablet, R-1Normal             Electronically signed by SALOMÓN Pope CNP on 10/10/24 at 11:05 AM EDT     This dictation was generated by voice recognition computer software. Although all attempts are made to edit the dictation for accuracy, there may be errors in the transcription that were not intended.

## 2024-10-31 DIAGNOSIS — F31.9 BIPOLAR AFFECTIVE DISORDER, REMISSION STATUS UNSPECIFIED (HCC): ICD-10-CM

## 2024-10-31 RX ORDER — BUPROPION HYDROCHLORIDE 300 MG/1
300 TABLET ORAL EVERY MORNING
Qty: 90 TABLET | Refills: 1 | OUTPATIENT
Start: 2024-10-31

## 2024-10-31 NOTE — TELEPHONE ENCOUNTER
Medication:   Requested Prescriptions     Pending Prescriptions Disp Refills    buPROPion (WELLBUTRIN XL) 300 MG extended release tablet [Pharmacy Med Name: buPROPion HCL  MG TABLET] 90 tablet 1     Sig: TAKE 1 TABLET BY MOUTH EVERY MORNING        Last Filled:  SENT 10.10.2024 #90 W/ 1 REFILL - REQUEST DENIED.    Patient Phone Number: [phone] (home)     Last appt: 10/10/2024   Next appt: Visit date not found    Last OARRS:       6/28/2017     2:53 PM   RX Monitoring   Attestation The Prescription Monitoring Report for this patient was reviewed today.   Periodic Controlled Substance Monitoring Possible medication side effects, risk of tolerance and/or dependence, and alternative treatments discussed;No signs of potential drug abuse or diversion identified.

## 2024-12-27 ENCOUNTER — PATIENT MESSAGE (OUTPATIENT)
Dept: PRIMARY CARE CLINIC | Age: 54
End: 2024-12-27

## 2024-12-27 ENCOUNTER — TELEMEDICINE (OUTPATIENT)
Dept: PRIMARY CARE CLINIC | Age: 54
End: 2024-12-27
Payer: COMMERCIAL

## 2024-12-27 DIAGNOSIS — M54.42 ACUTE MIDLINE LOW BACK PAIN WITH LEFT-SIDED SCIATICA: Primary | ICD-10-CM

## 2024-12-27 PROCEDURE — 99214 OFFICE O/P EST MOD 30 MIN: CPT | Performed by: NURSE PRACTITIONER

## 2024-12-27 PROCEDURE — 3017F COLORECTAL CA SCREEN DOC REV: CPT | Performed by: NURSE PRACTITIONER

## 2024-12-27 PROCEDURE — G8427 DOCREV CUR MEDS BY ELIG CLIN: HCPCS | Performed by: NURSE PRACTITIONER

## 2024-12-27 RX ORDER — CYCLOBENZAPRINE HCL 10 MG
10 TABLET ORAL 2 TIMES DAILY PRN
Qty: 20 TABLET | Refills: 0 | Status: SHIPPED | OUTPATIENT
Start: 2024-12-27 | End: 2025-01-06

## 2024-12-27 RX ORDER — PREDNISONE 10 MG/1
TABLET ORAL
Qty: 55 TABLET | Refills: 0 | Status: SHIPPED | OUTPATIENT
Start: 2024-12-27

## 2024-12-27 ASSESSMENT — ENCOUNTER SYMPTOMS
COUGH: 0
SHORTNESS OF BREATH: 0

## 2024-12-27 NOTE — TELEPHONE ENCOUNTER
Put patient on virtually today please call and see if she can get on right now ill see her after this physical.

## 2024-12-27 NOTE — PROGRESS NOTES
2024    TELEHEALTH EVALUATION -- Audio/Visual    HPI:    Rosa Hernandez (:  1970) has requested an audio/video evaluation for the following concern(s):    Patient is here with low back pain, patient said she it is taking ibuprofen and tylenol as needed. She is not getting much relief.     Patient is doing back exercises at home without relief.     Review of Systems   Constitutional:  Negative for chills, fatigue and fever.   Respiratory:  Negative for cough and shortness of breath.    All other systems reviewed and are negative.      Prior to Visit Medications    Medication Sig Taking? Authorizing Provider   predniSONE (DELTASONE) 10 MG tablet 5 tabs for 5 days, 4 tabs po daily for 4 days, then 3 po daily for 3 days, then 2 po daily for 2 days, then 1 po daily for 1 day Yes Charline Carrasquillo APRN - CNP   cyclobenzaprine (FLEXERIL) 10 MG tablet Take 1 tablet by mouth 2 times daily as needed for Muscle spasms Yes Charline Carrasquillo APRN - CNP   Semaglutide-Weight Management (WEGOVY) 1 MG/0.5ML SOAJ SC injection Inject 1 mg into the skin every 7 days  ProviderLj MD   omeprazole (PRILOSEC) 40 MG delayed release capsule Take 1 capsule by mouth every morning (before breakfast)  Charline Carrasquillo APRN - CNP   traZODone (DESYREL) 100 MG tablet Take 1 tablet by mouth nightly  Charline Carrasquillo APRN - CNP   buPROPion (WELLBUTRIN XL) 300 MG extended release tablet Take 1 tablet by mouth every morning  Charline Carrasquillo APRN - CNP   QUEtiapine Fumarate 150 MG TABS Take 1 tablet by mouth nightly  Charline Carrasquillo APRN - CNP   fluticasone (FLONASE) 50 MCG/ACT nasal spray 2 sprays by Each Nostril route daily  Charline Carrasquillo APRN - CNP   ketotifen (ZADITOR) 0.025 % ophthalmic solution INSTILL ONE DROP IN THE AFFECTED EYE(S) TWO TIMES A DAY  ProviderLj MD   promethazine (PHENERGAN) 25 MG tablet   ProviderLj MD   buprenorphine-naloxone (SUBOXONE) 8-2

## 2025-02-28 DIAGNOSIS — M54.42 CHRONIC BILATERAL LOW BACK PAIN WITH LEFT-SIDED SCIATICA: ICD-10-CM

## 2025-02-28 DIAGNOSIS — F31.9 BIPOLAR AFFECTIVE DISORDER, REMISSION STATUS UNSPECIFIED (HCC): ICD-10-CM

## 2025-02-28 DIAGNOSIS — G89.29 CHRONIC BILATERAL LOW BACK PAIN WITH LEFT-SIDED SCIATICA: ICD-10-CM

## 2025-02-28 RX ORDER — QUETIAPINE FUMARATE 150 MG/1
1 TABLET, FILM COATED ORAL NIGHTLY
Qty: 90 TABLET | Refills: 1 | OUTPATIENT
Start: 2025-02-28

## 2025-02-28 RX ORDER — LIDOCAINE 50 MG/G
1 PATCH TOPICAL DAILY
Qty: 30 PATCH | Refills: 1 | Status: SHIPPED | OUTPATIENT
Start: 2025-02-28 | End: 2025-04-29

## 2025-02-28 RX ORDER — BUPROPION HYDROCHLORIDE 300 MG/1
300 TABLET ORAL EVERY MORNING
Qty: 90 TABLET | Refills: 1 | OUTPATIENT
Start: 2025-02-28

## 2025-02-28 NOTE — TELEPHONE ENCOUNTER
Refill request declined, patient needs to schedule follow up appointment  for refills before 4/10- rx needs refilled on 4/10  Medication:   Requested Prescriptions     Pending Prescriptions Disp Refills    buPROPion (WELLBUTRIN XL) 300 MG extended release tablet 90 tablet 1     Sig: Take 1 tablet by mouth every morning    QUEtiapine Fumarate 150 MG TABS 90 tablet 1     Sig: Take 1 tablet by mouth nightly        Last Filled:  [id number] #90 x 1     Patient Phone Number: [phone] (home)     Last appt: 12/27/2024   Next appt:   Return in about 6 months (around 4/10/2025) for mood.    Last OARRS:       6/28/2017     2:53 PM   RX Monitoring   Attestation The Prescription Monitoring Report for this patient was reviewed today.   Periodic Controlled Substance Monitoring Possible medication side effects, risk of tolerance and/or dependence, and alternative treatments discussed;No signs of potential drug abuse or diversion identified.

## 2025-02-28 NOTE — TELEPHONE ENCOUNTER
Please let patient know she does need to follow-up in April I will send in patches for her for now but she needs a follow-up.

## 2025-02-28 NOTE — TELEPHONE ENCOUNTER
Patient requesting a medication refill.    Medication lidocaine   Dosage 5%  Frequency place 1 patch on to skin daily 12 hours on 12 hours off    Pharmacy [facility]  Next office visit None

## 2025-02-28 NOTE — TELEPHONE ENCOUNTER
Medication:   Requested Prescriptions     Pending Prescriptions Disp Refills    lidocaine (LIDODERM) 5 % 30 patch 1     Sig: Place 1 patch onto the skin daily 12 hours on, 12 hours off.        Last Filled:  [id number] 30 X 1    Patient Phone Number: [phone] (home)     Last appt: 12/27/2024   Next appt: Refuse to schedule FU for before 4/10    Last OARRS:       6/28/2017     2:53 PM   RX Monitoring   Attestation The Prescription Monitoring Report for this patient was reviewed today.   Periodic Controlled Substance Monitoring Possible medication side effects, risk of tolerance and/or dependence, and alternative treatments discussed;No signs of potential drug abuse or diversion identified.